# Patient Record
Sex: FEMALE | Race: BLACK OR AFRICAN AMERICAN | NOT HISPANIC OR LATINO
[De-identification: names, ages, dates, MRNs, and addresses within clinical notes are randomized per-mention and may not be internally consistent; named-entity substitution may affect disease eponyms.]

---

## 2018-03-16 ENCOUNTER — APPOINTMENT (OUTPATIENT)
Dept: INTERNAL MEDICINE | Facility: CLINIC | Age: 48
End: 2018-03-16
Payer: COMMERCIAL

## 2018-03-16 VITALS — SYSTOLIC BLOOD PRESSURE: 136 MMHG | DIASTOLIC BLOOD PRESSURE: 90 MMHG

## 2018-03-16 VITALS
BODY MASS INDEX: 45.15 KG/M2 | HEIGHT: 65 IN | WEIGHT: 271 LBS | SYSTOLIC BLOOD PRESSURE: 142 MMHG | DIASTOLIC BLOOD PRESSURE: 90 MMHG

## 2018-03-16 VITALS — DIASTOLIC BLOOD PRESSURE: 90 MMHG | SYSTOLIC BLOOD PRESSURE: 140 MMHG

## 2018-03-16 DIAGNOSIS — Z82.3 FAMILY HISTORY OF STROKE: ICD-10-CM

## 2018-03-16 DIAGNOSIS — Z78.9 OTHER SPECIFIED HEALTH STATUS: ICD-10-CM

## 2018-03-16 DIAGNOSIS — L65.9 NONSCARRING HAIR LOSS, UNSPECIFIED: ICD-10-CM

## 2018-03-16 DIAGNOSIS — Z83.3 FAMILY HISTORY OF DIABETES MELLITUS: ICD-10-CM

## 2018-03-16 PROCEDURE — 90715 TDAP VACCINE 7 YRS/> IM: CPT

## 2018-03-16 PROCEDURE — 90471 IMMUNIZATION ADMIN: CPT

## 2018-03-16 PROCEDURE — 99386 PREV VISIT NEW AGE 40-64: CPT | Mod: 25

## 2018-04-05 LAB
25(OH)D3 SERPL-MCNC: 20 NG/ML
ALBUMIN SERPL ELPH-MCNC: 4 G/DL
ALP BLD-CCNC: 69 U/L
ALT SERPL-CCNC: 15 U/L
ANION GAP SERPL CALC-SCNC: 12 MMOL/L
AST SERPL-CCNC: 16 U/L
BASOPHILS # BLD AUTO: 0.02 K/UL
BASOPHILS NFR BLD AUTO: 0.3 %
BILIRUB SERPL-MCNC: 0.3 MG/DL
BUN SERPL-MCNC: 18 MG/DL
CALCIUM SERPL-MCNC: 9.3 MG/DL
CHLORIDE SERPL-SCNC: 106 MMOL/L
CHOLEST SERPL-MCNC: 170 MG/DL
CHOLEST/HDLC SERPL: 3.3 RATIO
CO2 SERPL-SCNC: 25 MMOL/L
CREAT SERPL-MCNC: 0.86 MG/DL
EOSINOPHIL # BLD AUTO: 0.05 K/UL
EOSINOPHIL NFR BLD AUTO: 0.7 %
GLUCOSE SERPL-MCNC: 97 MG/DL
HBA1C MFR BLD HPLC: 5.8 %
HCT VFR BLD CALC: 36.5 %
HCV AB SER QL: NONREACTIVE
HCV S/CO RATIO: 0.12 S/CO
HDLC SERPL-MCNC: 51 MG/DL
HGB BLD-MCNC: 11.7 G/DL
HIV1+2 AB SPEC QL IA.RAPID: NONREACTIVE
IMM GRANULOCYTES NFR BLD AUTO: 0.1 %
LDLC SERPL CALC-MCNC: 106 MG/DL
LYMPHOCYTES # BLD AUTO: 2.57 K/UL
LYMPHOCYTES NFR BLD AUTO: 35.3 %
MAN DIFF?: NORMAL
MCHC RBC-ENTMCNC: 28.5 PG
MCHC RBC-ENTMCNC: 32.1 GM/DL
MCV RBC AUTO: 89 FL
MONOCYTES # BLD AUTO: 0.65 K/UL
MONOCYTES NFR BLD AUTO: 8.9 %
NEUTROPHILS # BLD AUTO: 3.99 K/UL
NEUTROPHILS NFR BLD AUTO: 54.7 %
PLATELET # BLD AUTO: 289 K/UL
POTASSIUM SERPL-SCNC: 4.5 MMOL/L
PROT SERPL-MCNC: 7.8 G/DL
RBC # BLD: 4.1 M/UL
RBC # FLD: 15.4 %
SODIUM SERPL-SCNC: 143 MMOL/L
TRIGL SERPL-MCNC: 67 MG/DL
TSH SERPL-ACNC: 0.91 UIU/ML
WBC # FLD AUTO: 7.29 K/UL

## 2018-05-29 ENCOUNTER — APPOINTMENT (OUTPATIENT)
Dept: OBGYN | Facility: CLINIC | Age: 48
End: 2018-05-29

## 2018-06-05 ENCOUNTER — APPOINTMENT (OUTPATIENT)
Dept: INTERNAL MEDICINE | Facility: CLINIC | Age: 48
End: 2018-06-05

## 2018-07-03 ENCOUNTER — APPOINTMENT (OUTPATIENT)
Dept: INTERNAL MEDICINE | Facility: CLINIC | Age: 48
End: 2018-07-03
Payer: COMMERCIAL

## 2018-07-03 VITALS
OXYGEN SATURATION: 98 % | WEIGHT: 266 LBS | BODY MASS INDEX: 44.27 KG/M2 | HEART RATE: 78 BPM | SYSTOLIC BLOOD PRESSURE: 120 MMHG | DIASTOLIC BLOOD PRESSURE: 82 MMHG

## 2018-07-03 VITALS — SYSTOLIC BLOOD PRESSURE: 120 MMHG | DIASTOLIC BLOOD PRESSURE: 78 MMHG

## 2018-07-03 PROCEDURE — 99213 OFFICE O/P EST LOW 20 MIN: CPT

## 2018-07-05 ENCOUNTER — MESSAGE (OUTPATIENT)
Age: 48
End: 2018-07-05

## 2018-07-12 ENCOUNTER — RX RENEWAL (OUTPATIENT)
Age: 48
End: 2018-07-12

## 2018-07-23 PROBLEM — Z78.9 ALCOHOL USE: Status: ACTIVE | Noted: 2018-03-16

## 2018-07-25 RX ORDER — DULAGLUTIDE 0.75 MG/.5ML
0.75 INJECTION, SOLUTION SUBCUTANEOUS
Qty: 1 | Refills: 2 | Status: DISCONTINUED | COMMUNITY
Start: 2018-07-05 | End: 2018-07-25

## 2018-07-25 NOTE — ADDENDUM
[FreeTextEntry1] : 7/25/18:  Tried trulicity for weight loss, pt took 3 injections (one per week), started having diarrhea soon after first dose, but for past several days having vomiting as well, tolerating minimal po (trying to drink coconut water, fluids), no abdominal pain, no fevers/chills.  Advised to stop trulicity, will eventually look into other options but not now, doesn't feel dizzy/lightheaded, advised small sips of fluids, bland diet with small meals/crackers (to advance as tolerated), if no improvement by next week or worsening symptoms to call back.

## 2018-07-25 NOTE — ASSESSMENT
[FreeTextEntry1] : 46 yo female with h/o as above including HTN and obesity here for f/up.  BP at goal, c/w amlodipine.  Will start walking up and down stairs for 15-20 minutes for exercise, advised cutting down on carbs at night, will also reach out to pharmacy to find out what meds for weight loss are covered under her plan.  RTO 4-5 months f/up visit, bp check and weight check.

## 2018-07-25 NOTE — HISTORY OF PRESENT ILLNESS
[FreeTextEntry1] : bp check [de-identified] : 46 yo female with h/o as below including HTN here for f/up and bp check.\par Eating less, eating less fatty foods, doesn't like sugar, doesn't eat pastry, doesn't drink milk.  Doesn't eat bread, does eat rice but brown rice.  Will eat 6-8 water crackers in the morning, will have 2 eggs in the morning.  Eats a lot of vegetables.  Doesn't snack in the middle of the day.  Doesn't eat lunch.  Dinner is candied yam, vegetable, chicken, turkey or fish.  Sometimes has white potatoes.  Sometimes eat shortbread at night.  Does drink a lot of ginger ale and water, doesn't drink juice, sometimes flavored water.  Hasn't drank alcohol in a long time but on occasion will have vodka.  Doesn't have much time for exercise, works 5 days/week with one job and another job on the weekends, and commute can be 2-4 hours each way due to not driving.  Has stairs at home.\par No other active issues, feeling well, tolerating amlodipine without difficulty.\par Would like medication to help with weight loss, only lost few lbs since last visit despite dietary changes.

## 2018-08-23 ENCOUNTER — TRANSCRIPTION ENCOUNTER (OUTPATIENT)
Age: 48
End: 2018-08-23

## 2018-11-02 ENCOUNTER — APPOINTMENT (OUTPATIENT)
Dept: INTERNAL MEDICINE | Facility: CLINIC | Age: 48
End: 2018-11-02
Payer: COMMERCIAL

## 2018-11-02 VITALS — HEART RATE: 88 BPM

## 2018-11-02 VITALS
BODY MASS INDEX: 43.65 KG/M2 | WEIGHT: 262 LBS | HEIGHT: 65 IN | SYSTOLIC BLOOD PRESSURE: 130 MMHG | DIASTOLIC BLOOD PRESSURE: 70 MMHG

## 2018-11-02 LAB
BILIRUB UR QL STRIP: NORMAL
CLARITY UR: CLEAR
COLLECTION METHOD: NORMAL
GLUCOSE UR-MCNC: NORMAL
HBA1C MFR BLD HPLC: 5.7
HCG UR QL: 0.2 EU/DL
HCG UR QL: NEGATIVE
HGB UR QL STRIP.AUTO: NORMAL
KETONES UR-MCNC: NORMAL
LEUKOCYTE ESTERASE UR QL STRIP: NORMAL
NITRITE UR QL STRIP: NORMAL
PH UR STRIP: 6.5
PROT UR STRIP-MCNC: NORMAL
QUALITY CONTROL: YES
SP GR UR STRIP: 1.02

## 2018-11-02 PROCEDURE — 90686 IIV4 VACC NO PRSV 0.5 ML IM: CPT

## 2018-11-02 PROCEDURE — G0008: CPT

## 2018-11-02 PROCEDURE — 99214 OFFICE O/P EST MOD 30 MIN: CPT | Mod: 25

## 2018-11-02 NOTE — ASSESSMENT
[FreeTextEntry1] : 47 yo female with h/o as above including HTN and morbid obesity here for f/up visit and to address several issues.\par 1.  CV - bp at goal\par 2.  GI - new GERD, will try Prilosec x 2-4 week trial, if persists or no improvement may need GI eval, could consider H pylori testing at some point if doesn't fully respond (was in [place] not too long ago); suspect rectal symptoms internal hemorrhoid that prolapses, offered CRS eval but declining currently, will monitor\par 3.   - frequent urination, UA negative, upreg negative, hgbA1c 5.7 (all noncontributory)\par 4.  Endo - did not tolerate trulicity, will try saxenda for weight loss instead, start 0.6 mg/day and then increase after one week to 1.2 mg/day (will start in a few weeks after GERD under control) and then to f/up in 1 month to adjust further, counseled on risks of pancreatitis, GI symptoms, and thyroid cancer, advised can't get pregnant on medication (still wants to try medication)\par 5.  HCM - flu shot today\par 6.  RTO 1 month f/up visit

## 2018-11-02 NOTE — HISTORY OF PRESENT ILLNESS
[FreeTextEntry1] : f/up visit [de-identified] : 49 yo female with h/o as below including HTN and obesity here for f/up visit.\par Has been having a lot of heartburn for past 1-2 weeks, burning from throat to stomach.  Burning anytime has something to eat or drink.  No chest pain, feels more like burning.  Takes Tums with slight relief.  Feeling full and bloated, new for her.  No dysphagia, no odynophagia, no blood in stool, no melena.  No diarrhea, but going to bathroom frequently for bowel movements as well as for urination.  Sometimes feeling more thirsty than usual.  Urinating more at night when she is awake, sleeps during the day.  Drinking gingerbear (soda).  No dysuria, no other urinary symptoms, just frequency.\par When having bowel movement, feels piece of "flesh" coming out, then eventually will slide back in but feeling very uncomfortable.  Not bleeding unless wipes hard.\par No other active issues.  Slight HA, sometimes very sharp pain in head (like lightening shock), sometimes sharp pain in vaginal area as well, both resolve very quickly.  No vision changes, no dizziness or fainting.\par No other active issues.  Would like flu shot today.  Last menses 10/7/18.  Interested in starting another medication for weight loss.

## 2018-11-02 NOTE — PHYSICAL EXAM
[No Acute Distress] : no acute distress [Well Nourished] : well nourished [Well Developed] : well developed [Well-Appearing] : well-appearing [Supple] : supple [No Respiratory Distress] : no respiratory distress  [Clear to Auscultation] : lungs were clear to auscultation bilaterally [No Accessory Muscle Use] : no accessory muscle use [Normal Rate] : normal rate  [Regular Rhythm] : with a regular rhythm [Normal S1, S2] : normal S1 and S2 [No Murmur] : no murmur heard [No Edema] : there was no peripheral edema [Soft] : abdomen soft [Non Tender] : non-tender [Non-distended] : non-distended [No Masses] : no abdominal mass palpated [No HSM] : no HSM [Normal Bowel Sounds] : normal bowel sounds [FreeTextEntry1] : small external hemorrhoids (nontender), no masses palpated on rectal exam internally, possible internal hemorrhoids

## 2018-12-07 ENCOUNTER — APPOINTMENT (OUTPATIENT)
Dept: INTERNAL MEDICINE | Facility: CLINIC | Age: 48
End: 2018-12-07
Payer: COMMERCIAL

## 2018-12-07 VITALS
WEIGHT: 260 LBS | HEIGHT: 65 IN | OXYGEN SATURATION: 98 % | SYSTOLIC BLOOD PRESSURE: 128 MMHG | BODY MASS INDEX: 43.32 KG/M2 | HEART RATE: 81 BPM | DIASTOLIC BLOOD PRESSURE: 78 MMHG

## 2018-12-07 PROCEDURE — 99213 OFFICE O/P EST LOW 20 MIN: CPT

## 2018-12-07 RX ORDER — LIRAGLUTIDE 6 MG/ML
18 INJECTION, SOLUTION SUBCUTANEOUS
Qty: 1 | Refills: 0 | Status: DISCONTINUED | COMMUNITY
Start: 2018-11-02 | End: 2018-12-07

## 2018-12-09 NOTE — ASSESSMENT
[FreeTextEntry1] : 47 yo female with h/o as above including HTN and morbid obesity here for f/up. Will start belviq for weight loss, counseled on side effects, will check CBC next month.  C/w low carb diet.  BP at goal.  RTO 1-2 months f/up visit.

## 2018-12-09 NOTE — HISTORY OF PRESENT ILLNESS
[FreeTextEntry1] : weight check [de-identified] : 47 yo female with h/o as below including HTN and morbid obesity here for f/up visit and weight check.  Saxenda wasn't approved as was instructed needed to try other medications first.  Willing to try another medication instead.  Has been working on diet, has used low carbohydrate diet and lost few lbs.  Difficult to exercise regularly due to work schedule.  Unable to come in to weight management clinic due to work schedule, hard to get into office.  Not looking to get pregnant at this time.  No other active issues.

## 2018-12-18 RX ORDER — LORCASERIN HYDROCHLORIDE HEMIHYDRATE 10 MG/1
10 TABLET ORAL TWICE DAILY
Qty: 60 | Refills: 2 | Status: DISCONTINUED | COMMUNITY
Start: 2018-12-07 | End: 2018-12-18

## 2018-12-19 RX ORDER — PEN NEEDLE, DIABETIC 29 G X1/2"
32G X 4 MM NEEDLE, DISPOSABLE MISCELLANEOUS
Qty: 1 | Refills: 3 | Status: ACTIVE | COMMUNITY
Start: 2018-12-19 | End: 1900-01-01

## 2019-03-08 ENCOUNTER — APPOINTMENT (OUTPATIENT)
Dept: INTERNAL MEDICINE | Facility: CLINIC | Age: 49
End: 2019-03-08

## 2019-03-11 ENCOUNTER — APPOINTMENT (OUTPATIENT)
Dept: INTERNAL MEDICINE | Facility: CLINIC | Age: 49
End: 2019-03-11
Payer: COMMERCIAL

## 2019-03-11 ENCOUNTER — NON-APPOINTMENT (OUTPATIENT)
Age: 49
End: 2019-03-11

## 2019-03-11 VITALS
SYSTOLIC BLOOD PRESSURE: 120 MMHG | WEIGHT: 251 LBS | HEART RATE: 90 BPM | DIASTOLIC BLOOD PRESSURE: 82 MMHG | BODY MASS INDEX: 41.82 KG/M2 | OXYGEN SATURATION: 96 % | HEIGHT: 65 IN

## 2019-03-11 PROCEDURE — 99214 OFFICE O/P EST MOD 30 MIN: CPT | Mod: 25

## 2019-03-11 PROCEDURE — 93000 ELECTROCARDIOGRAM COMPLETE: CPT

## 2019-03-22 LAB
25(OH)D3 SERPL-MCNC: 23.5 NG/ML
ALBUMIN SERPL ELPH-MCNC: 4 G/DL
ALP BLD-CCNC: 83 U/L
ALT SERPL-CCNC: 14 U/L
ANION GAP SERPL CALC-SCNC: 13 MMOL/L
AST SERPL-CCNC: 14 U/L
BASOPHILS # BLD AUTO: 0.03 K/UL
BASOPHILS NFR BLD AUTO: 0.3 %
BILIRUB SERPL-MCNC: 0.4 MG/DL
BUN SERPL-MCNC: 11 MG/DL
CALCIUM SERPL-MCNC: 9 MG/DL
CHLORIDE SERPL-SCNC: 104 MMOL/L
CHOLEST SERPL-MCNC: 158 MG/DL
CHOLEST/HDLC SERPL: 2.6 RATIO
CO2 SERPL-SCNC: 24 MMOL/L
CREAT SERPL-MCNC: 0.75 MG/DL
EOSINOPHIL # BLD AUTO: 0.07 K/UL
EOSINOPHIL NFR BLD AUTO: 0.8 %
GLUCOSE SERPL-MCNC: 76 MG/DL
HBA1C MFR BLD HPLC: 5.5 %
HCG SERPL QL: NEGATIVE
HCT VFR BLD CALC: 39 %
HDLC SERPL-MCNC: 60 MG/DL
HGB BLD-MCNC: 11.4 G/DL
IMM GRANULOCYTES NFR BLD AUTO: 0.2 %
LDLC SERPL CALC-MCNC: 89 MG/DL
LYMPHOCYTES # BLD AUTO: 2.95 K/UL
LYMPHOCYTES NFR BLD AUTO: 32.2 %
MAN DIFF?: NORMAL
MCHC RBC-ENTMCNC: 26.5 PG
MCHC RBC-ENTMCNC: 29.2 GM/DL
MCV RBC AUTO: 90.5 FL
MONOCYTES # BLD AUTO: 0.67 K/UL
MONOCYTES NFR BLD AUTO: 7.3 %
NEUTROPHILS # BLD AUTO: 5.42 K/UL
NEUTROPHILS NFR BLD AUTO: 59.2 %
PAPP-A SERPL-ACNC: <1 MIU/ML
PLATELET # BLD AUTO: 308 K/UL
POTASSIUM SERPL-SCNC: 3.9 MMOL/L
PROT SERPL-MCNC: 7.3 G/DL
RBC # BLD: 4.31 M/UL
RBC # FLD: 15.9 %
SODIUM SERPL-SCNC: 141 MMOL/L
TRIGL SERPL-MCNC: 44 MG/DL
TSH SERPL-ACNC: 1.76 UIU/ML
WBC # FLD AUTO: 9.16 K/UL

## 2019-03-22 NOTE — PHYSICAL EXAM
[No Acute Distress] : no acute distress [Well Nourished] : well nourished [Well Developed] : well developed [Well-Appearing] : well-appearing [Supple] : supple [No Respiratory Distress] : no respiratory distress  [Clear to Auscultation] : lungs were clear to auscultation bilaterally [No Accessory Muscle Use] : no accessory muscle use [Normal Rate] : normal rate  [Regular Rhythm] : with a regular rhythm [Normal S1, S2] : normal S1 and S2 [No Murmur] : no murmur heard [de-identified] : trace-1+pitting edema LE b/l

## 2019-03-22 NOTE — REVIEW OF SYSTEMS
[Negative] : Heme/Lymph [FreeTextEntry5] : see hpi [FreeTextEntry7] : see hpi [de-identified] : see hpi

## 2019-03-22 NOTE — ADDENDUM
[FreeTextEntry1] : 3/22/19:  Discussed labs with pt, nl except Hgb 11.4 c/w slight anemia (suspect from menstruation), vitamin D 23 c/w insufficiency, to take vitamin D 2000 Iu/day, other labs nl.  Notes has had vomiting with victoza, did increase dose to 1.8 mg last 2 times, this time had vomiting significantly today, unable to tolerate solid po, some abdominal pain - advised to stop victoza for now, if continued abdominal pain or inability to take po, to go to ER to r/o pancreatitis, sips of fluids for now.

## 2019-03-22 NOTE — HISTORY OF PRESENT ILLNESS
[FreeTextEntry1] : f/up, weight check [de-identified] : 47 yo female with h/o as below including obesity and HTN here for f/up visit and weight check.  \josé miguel Has been using victoza, but having upset stomach and burping with it, difficulty eating.  If skips every other day and not eating after it, will feel better.  Lost 9 lbs since last visit.  Has been on victoza 1.2 mg every other day.  Would like to know if medication could be changed, if can get quicker weight loss.  Going up and down stairs at home and at work.  Eating a lot less than before.  \josé miguel Had menses last month on 2/12, then had spotting on 3/6/19, not true menses.  Would like to do pregnancy test today.  \josé miguel Was shopping in a store recently, suddenly had some palpitations and then things went dark, didn't pass out but had to hang head over shopping cart, then resolved.  Happened once years before and had evaluation done then and was told was fine.\par No other active issues.

## 2019-03-22 NOTE — ASSESSMENT
[FreeTextEntry1] : 47 yo female with h/o as above including HTN, preDM, morbid obesity, here for f/up visit and weight check.\par 1.  Endo - weight improved on victoza 1.2 mg (can only tolerate every other day), will increase to 1.8 mg, c/w diet/exercise; check TSH and hgBA1c\par 2.  CV - bp at goal on amlodipine; ECG done today for episode of palpitations/presyncope NSR with no gross abnl, ?vasovagal episode (with palpitations compensatory tachycardia prior to presyncope) vs. arrhythmia, if recurs will eval further; check lipids\par 3. Gyn - check pregnancy test for irregular menses this month\par 4.  RTO for CPE in few months as due and another weight check, check routine labs today as due\par

## 2019-04-12 LAB
DRUG ABUSE PANEL-9, SERUM: NORMAL
M TB IFN-G BLD-IMP: NEGATIVE
MEV IGG FLD QL IA: >300 AU/ML
MEV IGG+IGM SER-IMP: POSITIVE
QUANTIFERON TB PLUS MITOGEN MINUS NIL: >10 IU/ML
QUANTIFERON TB PLUS NIL: 0.02 IU/ML
QUANTIFERON TB PLUS TB1 MINUS NIL: 0 IU/ML
QUANTIFERON TB PLUS TB2 MINUS NIL: 0 IU/ML
RUBV IGG FLD-ACNC: 7.3 INDEX
RUBV IGG SER-IMP: POSITIVE

## 2019-04-15 ENCOUNTER — RX RENEWAL (OUTPATIENT)
Age: 49
End: 2019-04-15

## 2019-06-11 ENCOUNTER — APPOINTMENT (OUTPATIENT)
Dept: INTERNAL MEDICINE | Facility: CLINIC | Age: 49
End: 2019-06-11
Payer: COMMERCIAL

## 2019-06-11 VITALS
HEART RATE: 85 BPM | DIASTOLIC BLOOD PRESSURE: 72 MMHG | WEIGHT: 256 LBS | SYSTOLIC BLOOD PRESSURE: 110 MMHG | OXYGEN SATURATION: 98 % | HEIGHT: 65 IN | BODY MASS INDEX: 42.65 KG/M2

## 2019-06-11 DIAGNOSIS — Z87.19 PERSONAL HISTORY OF OTHER DISEASES OF THE DIGESTIVE SYSTEM: ICD-10-CM

## 2019-06-11 DIAGNOSIS — E01.0 IODINE-DEFICIENCY RELATED DIFFUSE (ENDEMIC) GOITER: ICD-10-CM

## 2019-06-11 DIAGNOSIS — Z87.898 PERSONAL HISTORY OF OTHER SPECIFIED CONDITIONS: ICD-10-CM

## 2019-06-11 PROCEDURE — 99396 PREV VISIT EST AGE 40-64: CPT

## 2019-06-11 RX ORDER — LIRAGLUTIDE 6 MG/ML
18 INJECTION SUBCUTANEOUS
Qty: 3 | Refills: 3 | Status: DISCONTINUED | COMMUNITY
Start: 2018-12-18 | End: 2019-06-11

## 2019-06-11 NOTE — HEALTH RISK ASSESSMENT
[0] : 2) Feeling down, depressed, or hopeless: Not at all (0) [MammogramComments] : ?2 years ago [PapSmearComments] : years ago [ColonoscopyComments] : never had

## 2019-06-11 NOTE — PHYSICAL EXAM
[No Acute Distress] : no acute distress [Well Nourished] : well nourished [Well Developed] : well developed [Well-Appearing] : well-appearing [Normal Oropharynx] : the oropharynx was normal [PERRL] : pupils equal round and reactive to light [Normal TMs] : both tympanic membranes were normal [Normal Nasal Mucosa] : the nasal mucosa was normal [Supple] : supple [No Lymphadenopathy] : no lymphadenopathy [No Respiratory Distress] : no respiratory distress  [Clear to Auscultation] : lungs were clear to auscultation bilaterally [No Accessory Muscle Use] : no accessory muscle use [Normal Rate] : normal rate  [Regular Rhythm] : with a regular rhythm [Normal S1, S2] : normal S1 and S2 [No Murmur] : no murmur heard [No Carotid Bruits] : no carotid bruits [Pedal Pulses Present] : the pedal pulses are present [Normal Appearance] : normal in appearance [No Nipple Discharge] : no nipple discharge [Soft] : abdomen soft [No Axillary Lymphadenopathy] : no axillary lymphadenopathy [Non Tender] : non-tender [Non-distended] : non-distended [No Masses] : no abdominal mass palpated [No HSM] : no HSM [Normal Bowel Sounds] : normal bowel sounds [Normal Supraclavicular Nodes] : no supraclavicular lymphadenopathy [Normal Axillary Nodes] : no axillary lymphadenopathy [Normal Posterior Cervical Nodes] : no posterior cervical lymphadenopathy [Normal Anterior Cervical Nodes] : no anterior cervical lymphadenopathy [Normal Inguinal Nodes] : no inguinal lymphadenopathy [No Joint Swelling] : no joint swelling [Normal Gait] : normal gait [Normal Affect] : the affect was normal [de-identified] : prominent thyroid without discrete nodules [de-identified] : trace pitting edema LE b/l [de-identified] : left nipple inversion (chronic) [de-identified] : right lateral hip tenderness to palpation, FROM right hip without difficulty or pain [de-identified] : 3-4 cm long linear burn right lateral hip/lateral upper thigh with overlying scab/crusting over much of burn, minimal exposed granulation tissue, no surrounding erythema/edema, no drainage from area; tattoos; scars left ankle from childhood

## 2019-06-11 NOTE — ASSESSMENT
[FreeTextEntry1] : 47 yo female with h/o as above including HTN, preDM, morbid obesity, here for CPE.\par 1.  CV - bp at goal, renewed amlodipine, lipids at goal recently\par 2.  Endo - preDM previously, didn't tolerate victoza for obesity, will try metformin for both, weight check in 3 months; thyromegaly with nl TSH recently and fam hx thyroid disease, check sono; advised vitamin D supplements for low vitamin D previously\par 3.  MSK - suspect bursitis right hip, referred to ortho for steroid injection\par 4.  Derm- resolving burn right hip, no evidence of infection, in healing stage, suggested silvadene cream if needed\par 5.  GI - offered colon cancer screening but declining, prefers to wait until age 51\par 6.  Gyn - referred again to gyn Dr. Adler for eval, pap, and discussion of pregnancy options given had tubal ligation; declines mammo and prefers to wait until age 51\par 7.  HCM - had nl labs otherwise few months ago so hold on repeating, vaccines utd\par 8.  RTO 3 months weight check, 6 months bp check

## 2019-06-11 NOTE — REVIEW OF SYSTEMS
[Negative] : Eyes [Fever] : no fever [Chills] : no chills [Fatigue] : no fatigue [Recent Change In Weight] : ~T no recent weight change [Chest Pain] : no chest pain [Palpitations] : no palpitations [Cough] : no cough [Shortness Of Breath] : no shortness of breath [Abdominal Pain] : no abdominal pain [Constipation] : no constipation [Nausea] : no nausea [Diarrhea] : diarrhea [Heartburn] : no heartburn [Vomiting] : no vomiting [Melena] : no melena [Dysuria] : no dysuria [Vaginal Discharge] : no vaginal discharge [Skin Rash] : no skin rash [Dizziness] : no dizziness [Fainting] : no fainting [Anxiety] : no anxiety [Depression] : no depression [Easy Bleeding] : no easy bleeding [Swollen Glands] : no swollen glands [Easy Bruising] : no easy bruising [FreeTextEntry3] : sees optho, wears glasses [FreeTextEntry9] : see hpi [de-identified] : see hpi [FreeTextEntry6] : SOB only if drinking too much coffee or angry

## 2019-06-11 NOTE — HISTORY OF PRESENT ILLNESS
[FreeTextEntry1] : physical [de-identified] : 49 yo female with h/o as below here for CPE.\par Feeling well overall.  Since last visit, had vomiting with victoza significantly, so stopped victoza and feeling better.  Trying to exercise, walking a lot, believes diet is good, lost a little more weight.  Would be interested in another weight loss drug if covered by insurance.  Had used metformin previously (tried mother's metformin) and worked well for her.\par Has been having right hip pain, lateral hip pain when sitting and something resting against it, or if sleeping on that hip at night.\par Also sat on hot curling iron right lateral hip recently, got terrible burn, now healing.\par No other active issues.

## 2019-06-11 NOTE — PAST MEDICAL HISTORY
[Menstruating] : menstruating [Excessive Bleeding] : there was excessive bleeding [Regular Cycle Intervals] : have been regular [Total Preg ___] : G[unfilled] [AB Spont ___] : miscarriages: [unfilled]  [Full Term ___] : Full Term: [unfilled]

## 2019-10-17 ENCOUNTER — APPOINTMENT (OUTPATIENT)
Dept: NEUROLOGY | Facility: CLINIC | Age: 49
End: 2019-10-17

## 2019-11-10 ENCOUNTER — EMERGENCY (EMERGENCY)
Facility: HOSPITAL | Age: 49
LOS: 1 days | Discharge: HOME | End: 2019-11-10
Attending: EMERGENCY MEDICINE | Admitting: EMERGENCY MEDICINE
Payer: COMMERCIAL

## 2019-11-10 VITALS
DIASTOLIC BLOOD PRESSURE: 93 MMHG | OXYGEN SATURATION: 99 % | WEIGHT: 257.06 LBS | SYSTOLIC BLOOD PRESSURE: 153 MMHG | HEART RATE: 83 BPM | RESPIRATION RATE: 18 BRPM | HEIGHT: 66 IN | TEMPERATURE: 98 F

## 2019-11-10 VITALS
RESPIRATION RATE: 18 BRPM | HEART RATE: 66 BPM | SYSTOLIC BLOOD PRESSURE: 108 MMHG | TEMPERATURE: 98 F | OXYGEN SATURATION: 98 % | DIASTOLIC BLOOD PRESSURE: 68 MMHG

## 2019-11-10 PROCEDURE — 73130 X-RAY EXAM OF HAND: CPT | Mod: 26,LT

## 2019-11-10 PROCEDURE — 99053 MED SERV 10PM-8AM 24 HR FAC: CPT

## 2019-11-10 PROCEDURE — 73130 X-RAY EXAM OF HAND: CPT

## 2019-11-10 PROCEDURE — 73110 X-RAY EXAM OF WRIST: CPT | Mod: 26,LT

## 2019-11-10 PROCEDURE — 99284 EMERGENCY DEPT VISIT MOD MDM: CPT

## 2019-11-10 PROCEDURE — 73110 X-RAY EXAM OF WRIST: CPT

## 2019-11-10 PROCEDURE — 99283 EMERGENCY DEPT VISIT LOW MDM: CPT

## 2019-11-10 RX ORDER — OXYCODONE HYDROCHLORIDE 5 MG/1
1 TABLET ORAL
Qty: 8 | Refills: 0
Start: 2019-11-10 | End: 2019-11-11

## 2019-11-10 RX ORDER — LIDOCAINE 4 G/100G
1 CREAM TOPICAL ONCE
Refills: 0 | Status: COMPLETED | OUTPATIENT
Start: 2019-11-10 | End: 2019-11-10

## 2019-11-10 RX ORDER — OXYCODONE HYDROCHLORIDE 5 MG/1
10 TABLET ORAL ONCE
Refills: 0 | Status: DISCONTINUED | OUTPATIENT
Start: 2019-11-10 | End: 2019-11-10

## 2019-11-10 RX ORDER — ACETAMINOPHEN 500 MG
975 TABLET ORAL ONCE
Refills: 0 | Status: COMPLETED | OUTPATIENT
Start: 2019-11-10 | End: 2019-11-10

## 2019-11-10 RX ORDER — DIAZEPAM 5 MG
2 TABLET ORAL ONCE
Refills: 0 | Status: DISCONTINUED | OUTPATIENT
Start: 2019-11-10 | End: 2019-11-10

## 2019-11-10 RX ORDER — IBUPROFEN 200 MG
600 TABLET ORAL ONCE
Refills: 0 | Status: COMPLETED | OUTPATIENT
Start: 2019-11-10 | End: 2019-11-10

## 2019-11-10 RX ADMIN — LIDOCAINE 1 PATCH: 4 CREAM TOPICAL at 07:04

## 2019-11-10 RX ADMIN — Medication 975 MILLIGRAM(S): at 05:05

## 2019-11-10 RX ADMIN — Medication 600 MILLIGRAM(S): at 05:05

## 2019-11-10 RX ADMIN — OXYCODONE HYDROCHLORIDE 10 MILLIGRAM(S): 5 TABLET ORAL at 07:04

## 2019-11-10 NOTE — ED ADULT NURSE NOTE - OBJECTIVE STATEMENT
Patient is a 49y female presenting to the ED ambulatory from home s/p fall. Patient A&Ox.4. Patient reports slipping and falling on nice at around 8:30-9 pm Reports falling backwards and used her hands to break her fall. Denies head trauma. Denies LOC. Reports 9/10 lower back pain with movement. Reports being able to ambulate after the fall but reports worsening pain with ambulation. Also reports 4/10 left shoulder pain after the fall. PMH HTN, DM on metformin. Patient is a 49y female presenting to the ED ambulatory from home s/p fall. Patient A&Ox.4. Patient reports slipping and falling on nice at around 8:30-9 pm Reports falling backwards and used her hands to break her fall. Denies head trauma. Denies LOC. Reports 9/10 lower back pain with movement. Reports being able to ambulate after the fall but reports worsening pain with ambulation. Also reports 4/10 left shoulder pain after the fall. Heart sounds heard upon auscultation. Lung sounds clear bilaterally. Abdomen soft, non-distended, and non-tender upon palpation. Denies chest pain, SOB, headache, N/V/D, abdominal pain, fever/chills, numbness/tingling, burning upon urination or difficulty urinating, hematuria. PMH HTN, DM on metformin.

## 2019-11-10 NOTE — ED PROVIDER NOTE - PHYSICAL EXAMINATION
Gen: AAOx3, non-toxic  Head: NCAT  HEENT: EOMI, oral mucosa moist, normal conjunctiva  Lung: CTAB, no respiratory distress, no wheezes/rhonchi/rales B/L, speaking in full sentences  CV: RRR, no murmurs, rubs or gallops  Abd: soft, NTND, no guarding, no CVA tenderness  MSK: no cervical spine TTP. Lumbar back pain.   Neuro: No focal sensory or motor deficits, normal CN exam   Skin: Warm, well perfused, no rash  Psych: normal affect.

## 2019-11-10 NOTE — ED ADULT NURSE NOTE - NSIMPLEMENTINTERV_GEN_ALL_ED
Implemented All Fall Risk Interventions:  [place] to call system. Call bell, personal items and telephone within reach. Instruct patient to call for assistance. Room bathroom lighting operational. Non-slip footwear when patient is off stretcher. Physically safe environment: no spills, clutter or unnecessary equipment. Stretcher in lowest position, wheels locked, appropriate side rails in place. Provide visual cue, wrist band, yellow gown, etc. Monitor gait and stability. Monitor for mental status changes and reorient to person, place, and time. Review medications for side effects contributing to fall risk. Reinforce activity limits and safety measures with patient and family.

## 2019-11-10 NOTE — ED PROVIDER NOTE - NSFOLLOWUPINSTRUCTIONS_ED_ALL_ED_FT
You were seen in the ED for a fall resulting in back pain  The following labs/imaging were obtained: see attached  Take Acetaminophen (Tylenol) (no more than 4,000mg in 24 hrs) AND /OR Ibuprofen (Motrin) with meals (no more than 1,200mg in 24 hrs) for pain.  Return to the ED if you develop fever, worsening pain, or new symptoms.   Follow up with your primary care in 2-3 days.  Discussed with pt results of work up, strict return precautions, and need for follow up.  Pt expressed understanding and agrees with plan.

## 2019-11-10 NOTE — ED ADULT NURSE REASSESSMENT NOTE - NS ED NURSE REASSESS COMMENT FT1
report received from Rodrigo EMANUEL. patient is resting in bed. family at bedside. patient complains of pain at an 8/10 in the lower back. patient is aaox3, lung sounds CTA bilaterally, radial pulses +2 bilaterally, abdomen soft, nondistended, nontender. patient denies SOB, chest pain, n/v/d, cough, HA, dizziness, abd pain, weakness, numbness or tingling. VSS. will continue to monitor. patient safety and comfort maintained.

## 2019-11-10 NOTE — ED PROVIDER NOTE - NS ED ROS FT
GENERAL: No fever or chills  EYES: no change in vision  HEENT: no trouble swallowing or speaking  CARDIAC: no chest pain or palpitations   PULMONARY: no cough or SOB  GI: no abdominal pain, nausea, vomiting, diarrhea, or constipation   : No changes in urination  SKIN: no rashes  NEURO: no headache, numbness, or weakness.  MSK: see hpi

## 2019-11-10 NOTE — ED ADULT NURSE REASSESSMENT NOTE - NS ED NURSE REASSESS COMMENT FT1
Patient reports 9/10 lower back pain. MD Gallardo made aware, patient to receive pain medication as per EMAR. Plan of care discussed. Safety and comfort measures maintained. Will continue to monitor.

## 2019-11-10 NOTE — ED PROVIDER NOTE - CARE PLAN
Principal Discharge DX:	Fall, initial encounter Principal Discharge DX:	Wrist sprain, left, initial encounter

## 2019-11-10 NOTE — ED PROVIDER NOTE - ATTENDING CONTRIBUTION TO CARE
Dr. Gallardo (Attending Physician)  I performed a history and physical exam of the patient and discussed their management with the resident. I reviewed the resident's note and agree with the documented findings and plan of care. My medical decision making and observations are found above.

## 2019-11-10 NOTE — ED PROVIDER NOTE - CLINICAL SUMMARY MEDICAL DECISION MAKING FREE TEXT BOX
50 yo F with DM and HTn presents after falling yesterday at 9 am now with lumbar back pain and left wrist. Nexus criteria 0. Will provide analgesia and xray. Will re-sb Dr. Gallardo (Attending Physician)  48 yo F with DM and HTN presents after falling yesterday at 9 am now with lumbar back pain and left wrist. Nexus criteria 0. Will provide analgesia and xray. Will re-eval.

## 2019-11-10 NOTE — ED PROVIDER NOTE - PATIENT PORTAL LINK FT
You can access the FollowMyHealth Patient Portal offered by [facility] by registering at the following website: [email or web address]. By joining [employer]’s FollowMyHealth portal, you will also be able to view your health information using other applications (apps) compatible with our system. You can access the FollowMyHealth Patient Portal offered by [facility] by registering at the following website: [email or web address]. By joining [employer]’s FollowMyHealth portal, you will also be able to view your health information using other applications (apps) compatible with our system.

## 2019-11-11 ENCOUNTER — APPOINTMENT (OUTPATIENT)
Dept: INTERNAL MEDICINE | Facility: CLINIC | Age: 49
End: 2019-11-11
Payer: COMMERCIAL

## 2019-11-11 ENCOUNTER — FORM ENCOUNTER (OUTPATIENT)
Age: 49
End: 2019-11-11

## 2019-11-11 VITALS
HEIGHT: 66 IN | SYSTOLIC BLOOD PRESSURE: 140 MMHG | DIASTOLIC BLOOD PRESSURE: 90 MMHG | WEIGHT: 263 LBS | BODY MASS INDEX: 42.27 KG/M2 | OXYGEN SATURATION: 98 % | HEART RATE: 83 BPM

## 2019-11-11 PROCEDURE — 99214 OFFICE O/P EST MOD 30 MIN: CPT

## 2019-11-11 NOTE — REVIEW OF SYSTEMS
[Joint Pain] : no joint pain [Joint Stiffness] : no joint stiffness [Muscle Weakness] : no muscle weakness [Joint Swelling] : no joint swelling [Back Pain] : back pain [Muscle Pain] : no muscle pain [Negative] : Respiratory

## 2019-11-11 NOTE — ASSESSMENT
[FreeTextEntry1] : Patient is a 49-year-old female with history of hypertension and cardiomyopathy who presents for complaint of low back pain after fall\par \par 1 lower back pain\par most likely second musculoskeletal soft tissue injury possible hematoma\par warm soaks stretching\par start Advil three tablets 200 mg Q8 hours and muscle relaxing 10 mg Flexeril three times a day ones of sedation\par check x-ray lumbar spine rule out fracture\par may use oxycodone for breakthrough pain\par letter for work not to work until reevaluate in next week.\par \par 2. Hypertension\par continue amlodipine pain may elevated blood pressure\par observe for now

## 2019-11-11 NOTE — PHYSICAL EXAM
[Normal] : no joint swelling and grossly normal strength and tone [No CVA Tenderness] : no CVA  tenderness [de-identified] : Lumbar tenderness negative straight leg raise strength five out of five sensory intact

## 2019-11-11 NOTE — HISTORY OF PRESENT ILLNESS
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

## 2019-11-12 ENCOUNTER — OUTPATIENT (OUTPATIENT)
Dept: OUTPATIENT SERVICES | Facility: HOSPITAL | Age: 49
LOS: 1 days | End: 2019-11-12
Payer: COMMERCIAL

## 2019-11-12 DIAGNOSIS — M79.18 MYALGIA, OTHER SITE: ICD-10-CM

## 2019-11-12 PROCEDURE — 72100 X-RAY EXAM L-S SPINE 2/3 VWS: CPT

## 2019-11-12 PROCEDURE — 72100 X-RAY EXAM L-S SPINE 2/3 VWS: CPT | Mod: 26

## 2019-11-15 ENCOUNTER — APPOINTMENT (OUTPATIENT)
Dept: INTERNAL MEDICINE | Facility: CLINIC | Age: 49
End: 2019-11-15
Payer: COMMERCIAL

## 2019-11-15 VITALS
DIASTOLIC BLOOD PRESSURE: 80 MMHG | WEIGHT: 265 LBS | SYSTOLIC BLOOD PRESSURE: 120 MMHG | HEART RATE: 90 BPM | HEIGHT: 66 IN | BODY MASS INDEX: 42.59 KG/M2 | OXYGEN SATURATION: 99 %

## 2019-11-15 DIAGNOSIS — R09.81 NASAL CONGESTION: ICD-10-CM

## 2019-11-15 PROCEDURE — 99214 OFFICE O/P EST MOD 30 MIN: CPT

## 2019-11-15 NOTE — HISTORY OF PRESENT ILLNESS
[FreeTextEntry1] : f/up visit [de-identified] : 50 yo female with h/o as below here for f/up from earlier this week, had injured back and then fell on her back, had a lot of lower back pain, xray nl, given flexeril, back pain not too bad now, improved with flexeril, pain now 3 out of 10, was much worse before.  Also wearing back brace.  Would like to go back to work.  No numbness/tingling in legs, no LE weakness.  No other active issues.  Needs refill on flonase for nasal congestion.

## 2019-11-15 NOTE — PHYSICAL EXAM
[No Acute Distress] : no acute distress [Well Developed] : well developed [Well Nourished] : well nourished [Well-Appearing] : well-appearing [Supple] : supple [No Respiratory Distress] : no respiratory distress  [No Spinal Tenderness] : no spinal tenderness [de-identified] : no paraspinal tenderness, notes slight discomfort left sacral region [de-identified] : 5/5 strength LE b/l

## 2019-11-15 NOTE — ASSESSMENT
[FreeTextEntry1] : 48 yo female with h/o as above including HTN and obesity here for f/up visit for back pain from muscle strain, now much improved on flexeril and with back brace, note given to return to work.  RTO prn, will f/up few weeks weight check.

## 2019-11-25 ENCOUNTER — APPOINTMENT (OUTPATIENT)
Dept: INTERNAL MEDICINE | Facility: CLINIC | Age: 49
End: 2019-11-25

## 2019-12-09 NOTE — ED ADULT TRIAGE NOTE - TEMPERATURE IN CELSIUS (DEGREES C)
Subjective:   Patient ID:  Bryant King is a 71 y.o. male who presents for follow-up of CVD    HPI: The patient is here for CAD.Patient is here for valvular heart disease.     The patient has no chest pain, SOB, TIA, palpitations, syncope or pre-syncope.Patient does not exercise quite as much as directed but quite active.        Review of Systems   Constitution: Negative for chills, decreased appetite, diaphoresis, fever, malaise/fatigue, night sweats, weight gain and weight loss.   HENT: Negative for congestion, hoarse voice, nosebleeds, sore throat and tinnitus.    Eyes: Negative for blurred vision, double vision, vision loss in left eye, vision loss in right eye, visual disturbance and visual halos.   Cardiovascular: Negative for chest pain, claudication, cyanosis, dyspnea on exertion, irregular heartbeat, leg swelling, near-syncope, orthopnea, palpitations, paroxysmal nocturnal dyspnea and syncope.   Respiratory: Negative for cough, hemoptysis, shortness of breath, sleep disturbances due to breathing, snoring, sputum production and wheezing.    Endocrine: Negative for cold intolerance, heat intolerance, polydipsia, polyphagia and polyuria.   Hematologic/Lymphatic: Negative for adenopathy and bleeding problem. Does not bruise/bleed easily.   Skin: Negative for color change, dry skin, flushing, itching, nail changes, poor wound healing, rash, skin cancer, suspicious lesions and unusual hair distribution.   Musculoskeletal: Negative for arthritis, back pain, falls, gout, joint pain, joint swelling, muscle cramps, muscle weakness, myalgias and stiffness.   Gastrointestinal: Negative for abdominal pain, anorexia, change in bowel habit, constipation, diarrhea, dysphagia, heartburn, hematemesis, hematochezia, melena and vomiting.   Genitourinary: Negative for decreased libido, dysuria, hematuria, hesitancy and urgency.   Neurological: Negative for excessive daytime sleepiness, dizziness, focal weakness, headaches,  "light-headedness, loss of balance, numbness, paresthesias, seizures, sensory change, tremors, vertigo and weakness.   Psychiatric/Behavioral: Negative for altered mental status, depression, hallucinations, memory loss, substance abuse and suicidal ideas. The patient does not have insomnia and is not nervous/anxious.    Allergic/Immunologic: Negative for environmental allergies and hives.       Objective: /83 (BP Location: Left arm, Patient Position: Sitting, BP Method: Large (Automatic))   Pulse 60   Ht 5' 8.5" (1.74 m)   Wt 91.9 kg (202 lb 9.6 oz)   BMI 30.36 kg/m²      Physical Exam   Constitutional: He is oriented to person, place, and time. He appears well-developed and well-nourished. No distress.   HENT:   Head: Normocephalic.   Eyes: Pupils are equal, round, and reactive to light. EOM are normal.   Neck: Normal range of motion. No thyromegaly present.   Cardiovascular: Normal rate, regular rhythm and intact distal pulses. Exam reveals no gallop and no friction rub.   Murmur heard.   Harsh midsystolic murmur is present with a grade of 1/6 at the upper right sternal border radiating to the neck.  Pulses:       Carotid pulses are 3+ on the right side, and 3+ on the left side.       Radial pulses are 3+ on the right side, and 3+ on the left side.        Femoral pulses are 3+ on the right side, and 3+ on the left side.       Popliteal pulses are 3+ on the right side, and 3+ on the left side.        Dorsalis pedis pulses are 3+ on the right side, and 3+ on the left side.        Posterior tibial pulses are 3+ on the right side, and 3+ on the left side.   Pulmonary/Chest: Effort normal and breath sounds normal. No respiratory distress. He has no wheezes. He has no rales. He exhibits no tenderness.   Abdominal: Soft. He exhibits no distension and no mass. There is no tenderness.   Musculoskeletal: Normal range of motion.   Lymphadenopathy:     He has no cervical adenopathy.   Neurological: He is alert and " oriented to person, place, and time.   Skin: Skin is warm. He is not diaphoretic. No cyanosis. Nails show no clubbing.   Psychiatric: He has a normal mood and affect. His speech is normal and behavior is normal. Judgment and thought content normal. Cognition and memory are normal.       Assessment:     1. Coronary artery disease involving native coronary artery of native heart without angina pectoris    2. S/P CABG x 1    3. S/P AVR (aortic valve replacement)    4. Overweight (BMI 25.0-29.9)    5. HTN (hypertension), benign    6. Dyslipidemia    7. Impaired fasting blood sugar    8. Diabetes mellitus without complication        Plan:   Discussed diet , achieving and maintaining ideal body weight, and exercise.   We reviewed meds in detail.  Reassured-Discussed goals, options, plan.  We have discussed the need for endocarditis prophylaxis.             Bryant was seen today for coronary artery disease involving native coronary artery of .    Diagnoses and all orders for this visit:    Coronary artery disease involving native coronary artery of native heart without angina pectoris  -     Lipid panel; Future; Expected date: 02/10/2021  -     Comprehensive metabolic panel; Future; Expected date: 02/10/2021  -     TSH; Future; Expected date: 02/10/2021  -     PSA, Screening; Future; Expected date: 02/10/2021  -     Hemoglobin A1c; Future; Expected date: 02/10/2021  -     EKG 12-lead; Future; Expected date: 02/10/2021    S/P CABG x 1  -     Lipid panel; Future; Expected date: 02/10/2021  -     Comprehensive metabolic panel; Future; Expected date: 02/10/2021  -     TSH; Future; Expected date: 02/10/2021  -     EKG 12-lead; Future; Expected date: 02/10/2021    S/P AVR (aortic valve replacement)  -     EKG 12-lead; Future; Expected date: 02/10/2021  -     amoxicillin (AMOXIL) 500 MG Tab; Take 1 tablet (500 mg total) by mouth every 12 (twelve) hours. 4 pills one hour before dental. for 10 days    Overweight (BMI 25.0-29.9)  -      TSH; Future; Expected date: 02/10/2021    HTN (hypertension), benign  -     Lipid panel; Future; Expected date: 02/10/2021  -     Comprehensive metabolic panel; Future; Expected date: 02/10/2021  -     TSH; Future; Expected date: 02/10/2021  -     EKG 12-lead; Future; Expected date: 02/10/2021    Dyslipidemia  -     Lipid panel; Future; Expected date: 02/10/2021  -     Comprehensive metabolic panel; Future; Expected date: 02/10/2021  -     TSH; Future; Expected date: 02/10/2021    Impaired fasting blood sugar  -     Hemoglobin A1c; Future; Expected date: 02/10/2021    Diabetes mellitus without complication  -     Hemoglobin A1c; Future; Expected date: 02/10/2021    Other orders  -     ALPRAZolam (XANAX) 0.5 MG tablet; Take 0.5 mg by mouth 2 (two) times daily as needed.   -     cetirizine (ZYRTEC) 10 MG tablet; Take 10 mg by mouth daily as needed.   -     fluticasone propionate (FLONASE) 50 mcg/actuation nasal spray; 1 spray by Nasal route daily as needed.   -     omega-3 fatty acids/fish oil (FISH OIL-OMEGA-3 FATTY ACIDS) 300-1,000 mg capsule; Take by mouth once daily.            Follow up in about 15 months (around 3/10/2021) for with ECG and labs.   36.8

## 2020-01-13 ENCOUNTER — APPOINTMENT (OUTPATIENT)
Dept: INTERNAL MEDICINE | Facility: CLINIC | Age: 50
End: 2020-01-13

## 2020-02-26 ENCOUNTER — APPOINTMENT (OUTPATIENT)
Dept: INTERNAL MEDICINE | Facility: CLINIC | Age: 50
End: 2020-02-26

## 2020-02-26 DIAGNOSIS — F43.10 POST-TRAUMATIC STRESS DISORDER, UNSPECIFIED: ICD-10-CM

## 2020-03-10 ENCOUNTER — APPOINTMENT (OUTPATIENT)
Dept: INTERNAL MEDICINE | Facility: CLINIC | Age: 50
End: 2020-03-10
Payer: COMMERCIAL

## 2020-03-10 VITALS — DIASTOLIC BLOOD PRESSURE: 90 MMHG | SYSTOLIC BLOOD PRESSURE: 130 MMHG

## 2020-03-10 VITALS
DIASTOLIC BLOOD PRESSURE: 90 MMHG | BODY MASS INDEX: 42.11 KG/M2 | HEIGHT: 66 IN | OXYGEN SATURATION: 99 % | WEIGHT: 262 LBS | SYSTOLIC BLOOD PRESSURE: 140 MMHG | HEART RATE: 69 BPM

## 2020-03-10 PROCEDURE — 99213 OFFICE O/P EST LOW 20 MIN: CPT

## 2020-03-10 RX ORDER — FLUTICASONE PROPIONATE 50 UG/1
50 SPRAY, METERED NASAL DAILY
Qty: 1 | Refills: 5 | Status: DISCONTINUED | COMMUNITY
Start: 2019-11-15 | End: 2020-03-10

## 2020-03-10 RX ORDER — OMEPRAZOLE MAGNESIUM 20 MG/1
TABLET, DELAYED RELEASE ORAL
Refills: 0 | Status: DISCONTINUED | COMMUNITY
End: 2020-03-10

## 2020-03-10 RX ORDER — AMLODIPINE BESYLATE 2.5 MG/1
2.5 TABLET ORAL
Qty: 90 | Refills: 3 | Status: DISCONTINUED | COMMUNITY
Start: 2018-03-16 | End: 2020-03-10

## 2020-03-10 RX ORDER — CYCLOBENZAPRINE HYDROCHLORIDE 10 MG/1
10 TABLET, FILM COATED ORAL 3 TIMES DAILY
Qty: 15 | Refills: 1 | Status: DISCONTINUED | COMMUNITY
Start: 2019-11-11 | End: 2020-03-10

## 2020-03-28 NOTE — ASSESSMENT
[FreeTextEntry1] : 50 yo female with h/o as above including HTN and obesity here for f/up visit.\par 1.  CV - bp elevated, will increase norvasc to 5 mg and recheck bp few months\par 2.  Gyn - rx mammo given as due and for "tingling" sensation on rare occasion\par 3.  Derm - advised regular moisturizers to feet (likely dry skin caused cracking), neosporin with bandages acutely to promote quick healing, no evidence of infection currently\par 4.  RTO few months bp check and cpe

## 2020-03-28 NOTE — PHYSICAL EXAM
[No Acute Distress] : no acute distress [Well Nourished] : well nourished [Well Developed] : well developed [Well-Appearing] : well-appearing [Supple] : supple [No Respiratory Distress] : no respiratory distress  [No Accessory Muscle Use] : no accessory muscle use [Clear to Auscultation] : lungs were clear to auscultation bilaterally [Normal Rate] : normal rate  [Regular Rhythm] : with a regular rhythm [Normal S1, S2] : normal S1 and S2 [No Murmur] : no murmur heard [No Edema] : there was no peripheral edema [de-identified] : 1-2 cm crack/laceration left heel, no surrounding erythema, edema, no drainage from area

## 2020-03-28 NOTE — HISTORY OF PRESENT ILLNESS
[FreeTextEntry1] : bp check [de-identified] : 48 yo female with h/o as below here for f/up visit.\josé miguel Since last visit, had seen Danielle just once for counseling, gave some suggestions for other job opportunities.  \par Sister from [place] staying with her, brother calls her, so she has more support, mood has been better.\par Had crack in heel few weeks ago, became painful 1 week ago.  No fevers/chills, no drainage from left heel.  Using peroxide to clean area.\par Slight HA but not for long, taking tylenol and will resolve or motrin and will resolve.  No dizziness or lightheadedness, no chest pain, no shortness of breath.  Checks bp at home, bp has been 140/100.  Doesn't cook with salt.\par Has slight tingling on rare occasion around left nipple, no pain.  Tends to get tingling during menses.  Hasn't had mammo in long time.\par No other active issues.

## 2020-04-16 ENCOUNTER — FORM ENCOUNTER (OUTPATIENT)
Age: 50
End: 2020-04-16

## 2020-07-22 ENCOUNTER — APPOINTMENT (OUTPATIENT)
Dept: INTERNAL MEDICINE | Facility: CLINIC | Age: 50
End: 2020-07-22
Payer: COMMERCIAL

## 2020-07-22 VITALS
WEIGHT: 260 LBS | BODY MASS INDEX: 41.78 KG/M2 | DIASTOLIC BLOOD PRESSURE: 85 MMHG | OXYGEN SATURATION: 99 % | SYSTOLIC BLOOD PRESSURE: 120 MMHG | HEIGHT: 66 IN | HEART RATE: 83 BPM

## 2020-07-22 PROCEDURE — 99396 PREV VISIT EST AGE 40-64: CPT

## 2020-07-22 RX ORDER — METFORMIN HYDROCHLORIDE 500 MG/1
500 TABLET, COATED ORAL DAILY
Qty: 90 | Refills: 3 | Status: DISCONTINUED | COMMUNITY
Start: 2019-06-11 | End: 2020-07-22

## 2020-07-22 NOTE — PAST MEDICAL HISTORY
[Menstruating] : menstruating [Regular Cycle Intervals] : have been regular [Excessive Bleeding] : there was excessive bleeding [Total Preg ___] : G[unfilled] [AB Spont ___] : miscarriages: [unfilled]  [Full Term ___] : Full Term: [unfilled]

## 2020-08-08 NOTE — HISTORY OF PRESENT ILLNESS
[FreeTextEntry1] : physical [de-identified] : 51 yo female with h/o as below here for CPE.\par Muscle spasms in the morning, sometimes hands will cramp.  \par Otherwise feeling well overall.\par Lost some weight, more active because of new job, mood is better now that she is working again.\par No other active issues.  \par

## 2020-08-08 NOTE — PHYSICAL EXAM
[No Acute Distress] : no acute distress [Well Nourished] : well nourished [Well-Appearing] : well-appearing [Well Developed] : well developed [Normal TMs] : both tympanic membranes were normal [PERRL] : pupils equal round and reactive to light [Normal Oropharynx] : the oropharynx was normal [Thyroid Normal, No Nodules] : the thyroid was normal and there were no nodules present [Supple] : supple [No Lymphadenopathy] : no lymphadenopathy [No Respiratory Distress] : no respiratory distress  [No Accessory Muscle Use] : no accessory muscle use [Clear to Auscultation] : lungs were clear to auscultation bilaterally [Normal Rate] : normal rate  [Regular Rhythm] : with a regular rhythm [Normal S1, S2] : normal S1 and S2 [No Carotid Bruits] : no carotid bruits [No Murmur] : no murmur heard [Pedal Pulses Present] : the pedal pulses are present [Normal Appearance] : normal in appearance [No Edema] : there was no peripheral edema [No Nipple Discharge] : no nipple discharge [No Axillary Lymphadenopathy] : no axillary lymphadenopathy [Non Tender] : non-tender [Non-distended] : non-distended [Soft] : abdomen soft [No Masses] : no abdominal mass palpated [No HSM] : no HSM [Normal Bowel Sounds] : normal bowel sounds [Normal Supraclavicular Nodes] : no supraclavicular lymphadenopathy [Normal Axillary Nodes] : no axillary lymphadenopathy [Normal Posterior Cervical Nodes] : no posterior cervical lymphadenopathy [Normal Anterior Cervical Nodes] : no anterior cervical lymphadenopathy [Normal Inguinal Nodes] : no inguinal lymphadenopathy [Normal Gait] : normal gait [No Rash] : no rash [No Joint Swelling] : no joint swelling [Normal Affect] : the affect was normal

## 2020-08-08 NOTE — ASSESSMENT
[FreeTextEntry1] : 49 yo female with h/o as above including morbid obesity, HTN, preDM, GERD here for CPE.\par 1.  CV - bp at goal, check lipids, reinforced diet/exercise\par 2.  MSK - check lytes given muscle cramps\par 3.  Gyn - to see gyn for pap as due, rx mammo given as due\par 4.  GI - due next year for colonoscopy\par 5.  HCM - check below labs; vaccines utd\par 6.  RTO prn or 6 months bp check\par \par

## 2020-08-08 NOTE — REVIEW OF SYSTEMS
[Recent Change In Weight] : ~T recent weight change [Negative] : ENT [Fever] : no fever [Chills] : no chills [Fatigue] : no fatigue [Chest Pain] : no chest pain [Palpitations] : no palpitations [Shortness Of Breath] : no shortness of breath [Cough] : no cough [Nausea] : no nausea [Constipation] : no constipation [Abdominal Pain] : no abdominal pain [Vomiting] : no vomiting [Heartburn] : no heartburn [Diarrhea] : diarrhea [Vaginal Discharge] : no vaginal discharge [Dysuria] : no dysuria [Melena] : no melena [Anxiety] : no anxiety [Fainting] : no fainting [Dizziness] : no dizziness [Skin Rash] : no skin rash [Depression] : no depression [Easy Bleeding] : no easy bleeding [Easy Bruising] : no easy bruising [FreeTextEntry3] : has optho but needs to see [Swollen Glands] : no swollen glands [FreeTextEntry9] : muscle spasms

## 2021-01-19 ENCOUNTER — APPOINTMENT (OUTPATIENT)
Dept: INTERNAL MEDICINE | Facility: CLINIC | Age: 51
End: 2021-01-19
Payer: COMMERCIAL

## 2021-01-19 PROCEDURE — 99442: CPT

## 2021-01-19 RX ORDER — OMEPRAZOLE MAGNESIUM 20 MG/1
TABLET, DELAYED RELEASE ORAL
Refills: 0 | Status: DISCONTINUED | COMMUNITY
End: 2021-01-19

## 2021-01-19 NOTE — ASSESSMENT
[FreeTextEntry1] : 49 yo female with h/o as above including HTN and preDM here for urinary frequency/dysuria past few days, suspect has UTI, unable to give urine sample for a few days so will empirically treat with macrobid and advised to call back if no improvement or symptoms don't fully resolve.  Advised to check pregnancy test as well given missed menses and atypical menses this month.  RTO prn.

## 2021-01-19 NOTE — REVIEW OF SYSTEMS
[Chills] : chills [Back Pain] : back pain [Headache] : headache [Negative] : Heme/Lymph [FreeTextEntry8] : see hpi

## 2021-01-19 NOTE — HISTORY OF PRESENT ILLNESS
[Home] : at home, [unfilled] , at the time of the visit. [Other Location: e.g. Home (Enter Location, City,State)___] : at [unfilled] [Verbal consent obtained from patient] : the patient, [unfilled] [FreeTextEntry8] : 51 yo female with h/o as below here for telephone visit for urinary symptoms.\par Having swelling in right foot, went to podiatrist, had xray, waiting on ultrasound (awaiting in a few days) and to do bloodwork.\par Also has overactive bladder, urinating frequently, pain with urination since Saturday.  +urgency, feels has to urinate every 5-10 minutes, feels like bladder isn't emptying, urinating only little bit at a time, had to wear a pad at work as couldn't keep running to the bathroom.  No hematuria, not foul-smelling.  Having chills, no fever.  Having pain in lower back, not having pain in mid-back.  No abdominal pain.  No nausea/vomiting.  LMP 1/2/21, no chance of pregnancy, missed menses the month of December.  Had lighter menses in 1/2/21.  Yesterday started Azo for bladder symptoms.\par Recently having HA also.  No known COVID exposures.  No nasal congestion, no coughing, no sore throat, no myalgias.  \par No other active issues.

## 2021-02-24 ENCOUNTER — RX RENEWAL (OUTPATIENT)
Age: 51
End: 2021-02-24

## 2021-04-12 ENCOUNTER — APPOINTMENT (OUTPATIENT)
Dept: INTERNAL MEDICINE | Facility: CLINIC | Age: 51
End: 2021-04-12
Payer: COMMERCIAL

## 2021-04-12 ENCOUNTER — NON-APPOINTMENT (OUTPATIENT)
Age: 51
End: 2021-04-12

## 2021-04-12 ENCOUNTER — TRANSCRIPTION ENCOUNTER (OUTPATIENT)
Age: 51
End: 2021-04-12

## 2021-04-12 PROCEDURE — 99442: CPT

## 2021-04-12 NOTE — PLAN
[FreeTextEntry1] : 49 yo female with h/o as above including obesity and HTN here for phone visit for COVID infection, day 8 today, not feeling any better, still having cough and SOB.  Advised mucinex, steam showers, fluids, mucinex, proning, to get pulse ox overnighted through [facility] and will check pulse ox at home and to go to ER if severe SOB or if pulse ox < 92%.  Rx albuterol MDI given if needed.  Will arrange for covid ab infusion.  Will follow closely.

## 2021-04-12 NOTE — HISTORY OF PRESENT ILLNESS
[Medical Office: ([facility])___] : at the medical office located in  [Verbal consent obtained from patient] : the patient, [unfilled] [FreeTextEntry1] : 49 yo female with h/o as below with COVID, had rapid covid test today at urgent care that was positive but started having symptoms maybe 7-8 days ago.  \par Cough is bothering her a lot, taking tylenol and nyquil, not helping her, just helps her sleep.  Feels cough from the chest.  Some shortness of breath at times and chest pain, but when went to urgent care a little while ago reports pulse ox was checked (wasn't told was abnl), had CXR that was reportedly nl and told chest pain just from COVID.  Feels chest pain with coughing, some SOB with ambulation.  Hasn't been taking anything for the cough.  Also having HA on and off but taking tylenol.  No other active issues.  Didn't get covid vaccine.     [Time Spent: ___ minutes] : I have spent [unfilled] minutes with the patient on the telephone

## 2021-04-13 ENCOUNTER — OUTPATIENT (OUTPATIENT)
Dept: OUTPATIENT SERVICES | Facility: HOSPITAL | Age: 51
LOS: 1 days | End: 2021-04-13
Payer: COMMERCIAL

## 2021-04-13 ENCOUNTER — APPOINTMENT (OUTPATIENT)
Dept: DISASTER EMERGENCY | Facility: HOSPITAL | Age: 51
End: 2021-04-13

## 2021-04-13 VITALS
HEART RATE: 83 BPM | WEIGHT: 255.07 LBS | RESPIRATION RATE: 16 BRPM | DIASTOLIC BLOOD PRESSURE: 78 MMHG | SYSTOLIC BLOOD PRESSURE: 115 MMHG | OXYGEN SATURATION: 100 % | HEIGHT: 67 IN | TEMPERATURE: 98 F

## 2021-04-13 VITALS
HEART RATE: 68 BPM | RESPIRATION RATE: 20 BRPM | SYSTOLIC BLOOD PRESSURE: 113 MMHG | DIASTOLIC BLOOD PRESSURE: 74 MMHG | TEMPERATURE: 98 F | OXYGEN SATURATION: 100 %

## 2021-04-13 DIAGNOSIS — U07.1 COVID-19: ICD-10-CM

## 2021-04-13 PROCEDURE — M0239: CPT

## 2021-04-13 RX ORDER — SODIUM CHLORIDE 9 MG/ML
250 INJECTION INTRAMUSCULAR; INTRAVENOUS; SUBCUTANEOUS
Refills: 0 | Status: DISCONTINUED | OUTPATIENT
Start: 2021-04-13 | End: 2021-04-27

## 2021-04-13 RX ADMIN — SODIUM CHLORIDE 25 MILLILITER(S): 9 INJECTION INTRAMUSCULAR; INTRAVENOUS; SUBCUTANEOUS at 11:27

## 2021-04-13 NOTE — CHART NOTE - NSCHARTNOTEFT_GEN_A_CORE
CC: Monoclonal Antibody Infusion/COVID 19 Positive on 4/12/21      History: Patient presents for infusion of monoclonal antiboty infusion. Patient has been screened and was deemed to be a candidate.    Symptoms/ Criteria: Cough, HA, intermittent SOB    Risk Profile includes: BMI>35, HTN    casirivimab/imdevimab in sodium chloride 0.9% (EUA) IVPB 250 milliLiter(s) IV Intermittent once  sodium chloride 0.9%. 250 milliLiter(s) IV Continuous <Continuous>      PMHx:  Infection due to severe acute respiratory syndrome coronavirus 2 (SARS-CoV-2)    COVID-19          T(F): 97.7  HR: 78  BP: 115/78  RR: 20  SpO2: 100%      PE:   Appearance: NAD	  HEENT:   Normal oral mucosa.   Lymphatic: No lymphadenopathy  Cardiovascular: Normal S1 S2, No JVD, No murmurs, No edema  Respiratory: Lungs clear to auscultation	  Gastrointestinal:  Soft, Non-tender. No guarding   Skin: warm and dry  Neurologic: Non-focal  Extremities: Normal range of motion.     ASSESSMENT:  Pt is a 50y year old Female Covid +  referred to the infusion center for Monoclonal antibody infusion (Regeneron).  Of note pt had a tubal ligation 15 years ago-cannot be pregnant -pregnancy waiver signed      PLAN:  - infusion procedure explained to patient   - Consent for monoclonal antibody infusion obtained   - Risk & benifits discussed/all questions answered  - infuse Regeneron over one hour   - will observe patient for one hour post infusion  and then if stable discharge home with oupt follow up as planned by PMD.    POST INFUSION ASSESSMENT:   DISCHARGE at approximately 1  hour post infusion    - Patient tolerated infusion well denies complaints of chest pain/SOB/dizziness/ palps  - VSS for discharge home  - D/C instructions given/ fact sheet included.  - Patient to follow-up with PCP as needed.

## 2021-04-15 ENCOUNTER — TRANSCRIPTION ENCOUNTER (OUTPATIENT)
Age: 51
End: 2021-04-15

## 2021-08-12 ENCOUNTER — RX RENEWAL (OUTPATIENT)
Age: 51
End: 2021-08-12

## 2021-11-10 NOTE — ED PROVIDER NOTE - CHILD ABUSE FACILITY
Detail Level: Detailed Quality 431: Preventive Care And Screening: Unhealthy Alcohol Use - Screening: Patient screened for unhealthy alcohol use using a single question and scores less than 2 times per year Quality 130: Documentation Of Current Medications In The Medical Record: Current Medications Documented Quality 226: Preventive Care And Screening: Tobacco Use: Screening And Cessation Intervention: Patient screened for tobacco use and is an ex/non-smoker [facility] Quality 431: Preventive Care And Screening: Unhealthy Alcohol Use - Screening: Patient not identified as an unhealthy alcohol user when screened for unhealthy alcohol use using a systematic screening method

## 2022-02-04 ENCOUNTER — NON-APPOINTMENT (OUTPATIENT)
Age: 52
End: 2022-02-04

## 2022-02-05 ENCOUNTER — RX RENEWAL (OUTPATIENT)
Age: 52
End: 2022-02-05

## 2022-02-07 ENCOUNTER — APPOINTMENT (OUTPATIENT)
Dept: INTERNAL MEDICINE | Facility: CLINIC | Age: 52
End: 2022-02-07

## 2022-03-04 ENCOUNTER — NON-APPOINTMENT (OUTPATIENT)
Age: 52
End: 2022-03-04

## 2022-03-07 ENCOUNTER — APPOINTMENT (OUTPATIENT)
Dept: INTERNAL MEDICINE | Facility: CLINIC | Age: 52
End: 2022-03-07
Payer: COMMERCIAL

## 2022-03-07 VITALS
HEART RATE: 79 BPM | OXYGEN SATURATION: 96 % | WEIGHT: 245 LBS | DIASTOLIC BLOOD PRESSURE: 80 MMHG | BODY MASS INDEX: 39.37 KG/M2 | HEIGHT: 66 IN | SYSTOLIC BLOOD PRESSURE: 130 MMHG

## 2022-03-07 DIAGNOSIS — M79.18 MYALGIA, OTHER SITE: ICD-10-CM

## 2022-03-07 PROCEDURE — 90686 IIV4 VACC NO PRSV 0.5 ML IM: CPT

## 2022-03-07 PROCEDURE — 99213 OFFICE O/P EST LOW 20 MIN: CPT | Mod: 25

## 2022-03-07 PROCEDURE — G0008: CPT

## 2022-03-07 RX ORDER — NITROFURANTOIN (MONOHYDRATE/MACROCRYSTALS) 25; 75 MG/1; MG/1
100 CAPSULE ORAL TWICE DAILY
Qty: 10 | Refills: 0 | Status: DISCONTINUED | COMMUNITY
Start: 2021-01-19 | End: 2022-03-07

## 2022-03-07 RX ORDER — ALBUTEROL SULFATE 90 UG/1
108 (90 BASE) INHALANT RESPIRATORY (INHALATION)
Qty: 1 | Refills: 0 | Status: DISCONTINUED | COMMUNITY
Start: 2021-04-12 | End: 2022-03-07

## 2022-03-07 NOTE — PHYSICAL EXAM
[No Acute Distress] : no acute distress [Well Nourished] : well nourished [Well Developed] : well developed [Well-Appearing] : well-appearing [Supple] : supple [No Respiratory Distress] : no respiratory distress  [No Accessory Muscle Use] : no accessory muscle use [Clear to Auscultation] : lungs were clear to auscultation bilaterally [Normal Rate] : normal rate  [Regular Rhythm] : with a regular rhythm [Normal S1, S2] : normal S1 and S2 [No Murmur] : no murmur heard [No Spinal Tenderness] : no spinal tenderness [Normal Gait] : normal gait [Normal Affect] : the affect was normal [de-identified] : 1+ pitting edema dorsal feet b/l, trace edema (nonpitting edema) calves b/l [de-identified] : notes discomfort across lower back b/l

## 2022-03-07 NOTE — HISTORY OF PRESENT ILLNESS
[FreeTextEntry1] : bp check [de-identified] : 52 yo female with h/o as below including HTN here for f/up and bp check.\par Has back pain when standing for more than a few minutes, will feel it in lower back, wears a brace.  Does housekeeping so very physically demanding.  Doesn't have time for PT.  \par Also swelling in feet chronically, also swollen in the morning.  \par No SOB but did when she was younger.  No chest pain, no HA, no vision changes (just had eye check and had new glasses), no chest pain, no shortness of breath, no palpitations, no dizziness/lightheadedness.\par Sometimes difficult to fall asleep, even when tired.  Tries to watch tv before bed.  \par Had menses in November but then skipped 2 months and then came back last month.  \par No other active issues.

## 2022-03-07 NOTE — ASSESSMENT
[FreeTextEntry1] : 50 yo female with h/o as above including HTN, preDM, obesity, here for f/up and bp check.\par 1.  CV - bp at goal, c/w current medication, mild edema of feet b/l but otherwise not significant, suspect from norvasc\par 2.  MSK - lower back strain, trial NSAID with food, c/w back brace, trial topical therapies, suggested weight loss to help with strain\par 3.  GI - declining colon cancer screening currently but will consider at next visit\par 4. Gyn - rx mammo given as due; likely in perimenopause\par 5.  Psych/sleep - suggested sleep hygiene and avoiding devices including tv that will activate her at night to help with insomnia\par 6.  HCM - flu shot today; consider labs next visit\par 7.  RTO few months cpe\par \par \par

## 2022-03-21 ENCOUNTER — APPOINTMENT (OUTPATIENT)
Dept: MAMMOGRAPHY | Facility: IMAGING CENTER | Age: 52
End: 2022-03-21
Payer: COMMERCIAL

## 2022-03-21 ENCOUNTER — RESULT REVIEW (OUTPATIENT)
Age: 52
End: 2022-03-21

## 2022-03-21 ENCOUNTER — APPOINTMENT (OUTPATIENT)
Dept: ULTRASOUND IMAGING | Facility: IMAGING CENTER | Age: 52
End: 2022-03-21
Payer: COMMERCIAL

## 2022-03-21 ENCOUNTER — OUTPATIENT (OUTPATIENT)
Dept: OUTPATIENT SERVICES | Facility: HOSPITAL | Age: 52
LOS: 1 days | End: 2022-03-21
Payer: COMMERCIAL

## 2022-03-21 DIAGNOSIS — Z00.8 ENCOUNTER FOR OTHER GENERAL EXAMINATION: ICD-10-CM

## 2022-03-21 PROCEDURE — 77067 SCR MAMMO BI INCL CAD: CPT | Mod: 26

## 2022-03-21 PROCEDURE — 77067 SCR MAMMO BI INCL CAD: CPT

## 2022-03-21 PROCEDURE — 77063 BREAST TOMOSYNTHESIS BI: CPT | Mod: 26

## 2022-03-21 PROCEDURE — 77063 BREAST TOMOSYNTHESIS BI: CPT

## 2022-03-25 ENCOUNTER — EMERGENCY (EMERGENCY)
Facility: HOSPITAL | Age: 52
LOS: 1 days | Discharge: HOME | End: 2022-03-25
Attending: EMERGENCY MEDICINE | Admitting: EMERGENCY MEDICINE
Payer: COMMERCIAL

## 2022-03-25 VITALS
RESPIRATION RATE: 18 BRPM | HEART RATE: 86 BPM | DIASTOLIC BLOOD PRESSURE: 61 MMHG | SYSTOLIC BLOOD PRESSURE: 106 MMHG | HEIGHT: 67 IN | OXYGEN SATURATION: 99 % | TEMPERATURE: 98 F

## 2022-03-25 VITALS
DIASTOLIC BLOOD PRESSURE: 81 MMHG | TEMPERATURE: 98 F | RESPIRATION RATE: 17 BRPM | SYSTOLIC BLOOD PRESSURE: 123 MMHG | OXYGEN SATURATION: 100 % | HEART RATE: 77 BPM

## 2022-03-25 LAB
ALBUMIN SERPL ELPH-MCNC: 3.9 G/DL — SIGNIFICANT CHANGE UP (ref 3.3–5)
ALP SERPL-CCNC: 63 U/L — SIGNIFICANT CHANGE UP (ref 40–120)
ALT FLD-CCNC: 19 U/L — SIGNIFICANT CHANGE UP (ref 4–33)
ANION GAP SERPL CALC-SCNC: 12 MMOL/L — SIGNIFICANT CHANGE UP (ref 7–14)
APPEARANCE UR: CLEAR — SIGNIFICANT CHANGE UP
APTT BLD: 27.5 SEC — SIGNIFICANT CHANGE UP (ref 27–36.3)
AST SERPL-CCNC: 27 U/L — SIGNIFICANT CHANGE UP (ref 4–32)
BASE EXCESS BLDV CALC-SCNC: 0.3 MMOL/L — SIGNIFICANT CHANGE UP (ref -2–3)
BASOPHILS # BLD AUTO: 0.02 K/UL — SIGNIFICANT CHANGE UP (ref 0–0.2)
BASOPHILS NFR BLD AUTO: 0.3 % — SIGNIFICANT CHANGE UP (ref 0–2)
BILIRUB SERPL-MCNC: 0.8 MG/DL — SIGNIFICANT CHANGE UP (ref 0.2–1.2)
BILIRUB UR-MCNC: NEGATIVE — SIGNIFICANT CHANGE UP
BLOOD GAS VENOUS COMPREHENSIVE RESULT: SIGNIFICANT CHANGE UP
BUN SERPL-MCNC: 17 MG/DL — SIGNIFICANT CHANGE UP (ref 7–23)
CALCIUM SERPL-MCNC: 8.9 MG/DL — SIGNIFICANT CHANGE UP (ref 8.4–10.5)
CHLORIDE BLDV-SCNC: 106 MMOL/L — SIGNIFICANT CHANGE UP (ref 96–108)
CHLORIDE SERPL-SCNC: 105 MMOL/L — SIGNIFICANT CHANGE UP (ref 98–107)
CK SERPL-CCNC: 440 U/L — HIGH (ref 25–170)
CO2 BLDV-SCNC: 26.7 MMOL/L — HIGH (ref 22–26)
CO2 SERPL-SCNC: 22 MMOL/L — SIGNIFICANT CHANGE UP (ref 22–31)
COLOR SPEC: SIGNIFICANT CHANGE UP
CREAT SERPL-MCNC: 0.83 MG/DL — SIGNIFICANT CHANGE UP (ref 0.5–1.3)
DIFF PNL FLD: NEGATIVE — SIGNIFICANT CHANGE UP
EGFR: 86 ML/MIN/1.73M2 — SIGNIFICANT CHANGE UP
EOSINOPHIL # BLD AUTO: 0.04 K/UL — SIGNIFICANT CHANGE UP (ref 0–0.5)
EOSINOPHIL NFR BLD AUTO: 0.7 % — SIGNIFICANT CHANGE UP (ref 0–6)
GAS PNL BLDV: 134 MMOL/L — LOW (ref 136–145)
GAS PNL BLDV: SIGNIFICANT CHANGE UP
GLUCOSE BLDV-MCNC: 119 MG/DL — HIGH (ref 70–99)
GLUCOSE SERPL-MCNC: 124 MG/DL — HIGH (ref 70–99)
GLUCOSE UR QL: NEGATIVE — SIGNIFICANT CHANGE UP
HCO3 BLDV-SCNC: 25 MMOL/L — SIGNIFICANT CHANGE UP (ref 22–29)
HCT VFR BLD CALC: 36.3 % — SIGNIFICANT CHANGE UP (ref 34.5–45)
HCT VFR BLDA CALC: 38 % — SIGNIFICANT CHANGE UP (ref 34.5–46.5)
HGB BLD CALC-MCNC: 12.5 G/DL — SIGNIFICANT CHANGE UP (ref 11.5–15.5)
HGB BLD-MCNC: 11.8 G/DL — SIGNIFICANT CHANGE UP (ref 11.5–15.5)
IANC: 3.34 K/UL — SIGNIFICANT CHANGE UP (ref 1.8–7.4)
IMM GRANULOCYTES NFR BLD AUTO: 0.3 % — SIGNIFICANT CHANGE UP (ref 0–1.5)
INR BLD: 1.07 RATIO — SIGNIFICANT CHANGE UP (ref 0.88–1.16)
KETONES UR-MCNC: NEGATIVE — SIGNIFICANT CHANGE UP
LACTATE BLDV-MCNC: 0.9 MMOL/L — SIGNIFICANT CHANGE UP (ref 0.5–2)
LEUKOCYTE ESTERASE UR-ACNC: NEGATIVE — SIGNIFICANT CHANGE UP
LYMPHOCYTES # BLD AUTO: 1.7 K/UL — SIGNIFICANT CHANGE UP (ref 1–3.3)
LYMPHOCYTES # BLD AUTO: 29.6 % — SIGNIFICANT CHANGE UP (ref 13–44)
MAGNESIUM SERPL-MCNC: 1.7 MG/DL — SIGNIFICANT CHANGE UP (ref 1.6–2.6)
MCHC RBC-ENTMCNC: 28.9 PG — SIGNIFICANT CHANGE UP (ref 27–34)
MCHC RBC-ENTMCNC: 32.5 GM/DL — SIGNIFICANT CHANGE UP (ref 32–36)
MCV RBC AUTO: 88.8 FL — SIGNIFICANT CHANGE UP (ref 80–100)
MONOCYTES # BLD AUTO: 0.63 K/UL — SIGNIFICANT CHANGE UP (ref 0–0.9)
MONOCYTES NFR BLD AUTO: 11 % — SIGNIFICANT CHANGE UP (ref 2–14)
NEUTROPHILS # BLD AUTO: 3.34 K/UL — SIGNIFICANT CHANGE UP (ref 1.8–7.4)
NEUTROPHILS NFR BLD AUTO: 58.1 % — SIGNIFICANT CHANGE UP (ref 43–77)
NITRITE UR-MCNC: NEGATIVE — SIGNIFICANT CHANGE UP
NRBC # BLD: 0 /100 WBCS — SIGNIFICANT CHANGE UP
NRBC # FLD: 0 K/UL — SIGNIFICANT CHANGE UP
NT-PROBNP SERPL-SCNC: 32 PG/ML — SIGNIFICANT CHANGE UP
PCO2 BLDV: 42 MMHG — SIGNIFICANT CHANGE UP (ref 39–42)
PCP SPEC-MCNC: SIGNIFICANT CHANGE UP
PH BLDV: 7.39 — SIGNIFICANT CHANGE UP (ref 7.32–7.43)
PH UR: 6 — SIGNIFICANT CHANGE UP (ref 5–8)
PHOSPHATE SERPL-MCNC: 2.8 MG/DL — SIGNIFICANT CHANGE UP (ref 2.5–4.5)
PLATELET # BLD AUTO: 318 K/UL — SIGNIFICANT CHANGE UP (ref 150–400)
PO2 BLDV: 35 MMHG — SIGNIFICANT CHANGE UP
POTASSIUM BLDV-SCNC: 4.4 MMOL/L — SIGNIFICANT CHANGE UP (ref 3.5–5.1)
POTASSIUM SERPL-MCNC: 4.1 MMOL/L — SIGNIFICANT CHANGE UP (ref 3.5–5.3)
POTASSIUM SERPL-SCNC: 4.1 MMOL/L — SIGNIFICANT CHANGE UP (ref 3.5–5.3)
PROT SERPL-MCNC: 7.4 G/DL — SIGNIFICANT CHANGE UP (ref 6–8.3)
PROT UR-MCNC: NEGATIVE — SIGNIFICANT CHANGE UP
PROTHROM AB SERPL-ACNC: 12.4 SEC — SIGNIFICANT CHANGE UP (ref 10.5–13.4)
RBC # BLD: 4.09 M/UL — SIGNIFICANT CHANGE UP (ref 3.8–5.2)
RBC # FLD: 15.3 % — HIGH (ref 10.3–14.5)
SAO2 % BLDV: 59.1 % — SIGNIFICANT CHANGE UP
SARS-COV-2 RNA SPEC QL NAA+PROBE: SIGNIFICANT CHANGE UP
SODIUM SERPL-SCNC: 139 MMOL/L — SIGNIFICANT CHANGE UP (ref 135–145)
SP GR SPEC: 1.01 — SIGNIFICANT CHANGE UP (ref 1–1.05)
TROPONIN T, HIGH SENSITIVITY RESULT: <6 NG/L — SIGNIFICANT CHANGE UP
UROBILINOGEN FLD QL: SIGNIFICANT CHANGE UP
WBC # BLD: 5.75 K/UL — SIGNIFICANT CHANGE UP (ref 3.8–10.5)
WBC # FLD AUTO: 5.75 K/UL — SIGNIFICANT CHANGE UP (ref 3.8–10.5)

## 2022-03-25 PROCEDURE — 70450 CT HEAD/BRAIN W/O DYE: CPT | Mod: 26,MA

## 2022-03-25 PROCEDURE — 99285 EMERGENCY DEPT VISIT HI MDM: CPT

## 2022-03-25 RX ORDER — ACETAMINOPHEN 500 MG
975 TABLET ORAL ONCE
Refills: 0 | Status: COMPLETED | OUTPATIENT
Start: 2022-03-25 | End: 2022-03-25

## 2022-03-25 RX ORDER — LIDOCAINE 4 G/100G
1 CREAM TOPICAL ONCE
Refills: 0 | Status: COMPLETED | OUTPATIENT
Start: 2022-03-25 | End: 2022-03-25

## 2022-03-25 RX ADMIN — LIDOCAINE 1 PATCH: 4 CREAM TOPICAL at 10:51

## 2022-03-25 RX ADMIN — Medication 975 MILLIGRAM(S): at 10:51

## 2022-03-25 NOTE — ED PROVIDER NOTE - NSFOLLOWUPINSTRUCTIONS_ED_ALL_ED_FT
Please follow up with your primary care physician within 2-3 days. Return to the ER for any new or concerning symptoms.    Follow up with Cardiology, you were provided a referral sheet for cardiology. Please call to set up an appointment.       Muscle Cramps and Spasms      Muscle cramps and spasms occur when a muscle or muscles tighten and you have no control over this tightening (involuntary muscle contraction). They are a common problem and can develop in any muscle. The most common place is in the calf muscles of the leg. Muscle cramps and muscle spasms are both involuntary muscle contractions, but there are some differences between the two:  •Muscle cramps are painful. They come and go and may last for a few seconds or up to 15 minutes. Muscle cramps are often more forceful and last longer than muscle spasms.      •Muscle spasms may or may not be painful. They may also last just a few seconds or much longer.      Certain medical conditions, such as diabetes or Parkinson's disease, can make it more likely to develop cramps or spasms. However, cramps or spasms are usually not caused by a serious underlying problem. Common causes include:  •Doing more physical work or exercise than your body is ready for (overexertion).      •Overuse from repeating certain movements too many times.      •Remaining in a certain position for a long period of time.      •Improper preparation, form, or technique while playing a sport or doing an activity.      •Dehydration.      •Injury.      •Side effects of some medicines.      •Abnormally low levels of the salts and minerals in your blood (electrolytes), especially potassium and calcium. This could happen if you are taking water pills (diuretics) or if you are pregnant.      In many cases, the cause of muscle cramps or spasms is not known.      Follow these instructions at home:      Managing pain and stiffness                   •Try massaging, stretching, and relaxing the affected muscle. Do this for several minutes at a time.    •If directed, apply heat to tight or tense muscles as often as told by your health care provider. Use the heat source that your health care provider recommends, such as a moist heat pack or a heating pad.  •Place a towel between your skin and the heat source.      •Leave the heat on for 20–30 minutes.      •Remove the heat if your skin turns bright red. This is especially important if you are unable to feel pain, heat, or cold. You may have a greater risk of getting burned.      •If directed, put ice on the affected area. This may help if you are sore or have pain after a cramp or spasm.  •Put ice in a plastic bag.      •Place a towel between your skin and the bag.      •Leave the ice on for 20 minutes, 2–3 times a day.        •Try taking hot showers or baths to help relax tight muscles.      Eating and drinking     •Drink enough fluid to keep your urine pale yellow. Staying well hydrated may help prevent cramps or spasms.      •Eat a healthy diet that includes plenty of nutrients to help your muscles function. A healthy diet includes fruits and vegetables, lean protein, whole grains, and low-fat or nonfat dairy products.      General instructions     •If you are having frequent cramps, avoid intense exercise for several days.      •Take over-the-counter and prescription medicines only as told by your health care provider.      •Pay attention to any changes in your symptoms.      •Keep all follow-up visits as told by your health care provider. This is important.        Contact a health care provider if:    •Your cramps or spasms get more severe or happen more often.      •Your cramps or spasms do not improve over time.        Summary    •Muscle cramps and spasms occur when a muscle or muscles tighten and you have no control over this tightening (involuntary muscle contraction).      •The most common place for cramps or spasms to occur is in the calf muscles of the leg.      •Massaging, stretching, and relaxing the affected muscle may relieve the cramp or spasm.      •Drink enough fluid to keep your urine pale yellow. Staying well hydrated may help prevent cramps or spasms

## 2022-03-25 NOTE — ED PROVIDER NOTE - PROGRESS NOTE DETAILS
Nevin Echevarria, PGY-1, EM:  Pt able to walk on their own. No dizziness, no syncope. Nevin Echevarria, PGY-1, EM:  called pt's PMD Roberta Ramachandran, who notes pt's affect is similar to her affect in the office. She will call pt on DC and set up an appointment. Agrees with plan for cardio follow up. Will send pt with copies of her ecg to take to her PMD. Nevin Echevarria, PGY-1, EM:  Pt able to walk on their own. No dizziness, no syncope.    Initial EKG with QTc 508, down to 479 on repeat.

## 2022-03-25 NOTE — ED PROVIDER NOTE - ATTENDING CONTRIBUTION TO CARE
Dr. Prieto: 50 yo female with HTN on amlodipine, in ED with 1 day of left thigh cramp (NO swelling) and then today episode of syncope (pt does not remember) after she attempted to get out of bed.  Per spouse, pt was attempting to get out of bed and had syncopal episode, eyes rolled into back of head and pt fell to ground, no head strike as per  who witnessed event.  After estimated few minutes of unconsciousness pt awoke.  She feels back to baseline now but does not remember this morning's event.  She continues to have left anterior thigh pain, somewhat better after stretching her leg.  Pt works in a very active job, possibly related to her left leg pain.  On exam pt well appearing, in NAD, heart RRR, lungs CTAB, abd NTND, extremities without swelling, strength 5/5 in all extremities and skin without rash.  Midline cervical/thoracic/lumbosacral spine without bony TTP or step off.  Left anterior thigh normal appearing, no swelling/erythema or focal TTP.

## 2022-03-25 NOTE — ED PROVIDER NOTE - CLINICAL SUMMARY MEDICAL DECISION MAKING FREE TEXT BOX
52 yo F with PMH of HTN on amlodipine presenting with leg cramps and back pain x 1 d.  This is concerning for an arrhythmia vs. electrolyte imbalance vs. vasovagal syncope. PT has multiple possible concern etiologies for her symptomatology. Pt will likely have to stay for an echo and monitoring. will order cbc, cmp, troponin, bmp, lytes, ua, uc. 50 yo F with PMH of HTN on amlodipine presenting with leg cramps and back pain x 1 d.  This is concerning for an arrhythmia vs. electrolyte imbalance vs. vasovagal syncope. PT has multiple possible concern etiologies for her symptomatology. P will order cbc, cmp, troponin, bmp, lytes, ua, uc.

## 2022-03-25 NOTE — ED PROVIDER NOTE - PHYSICAL EXAMINATION
General: WN/WD NAD  Head: Atraumatic, normocephalic  Eyes: EOM grossly in tact, no scleral icterus, no discharge  ENT: moist mucous membranes  Neurology: A&Ox 3, nonfocal, PATEL x 4  Respiratory: CTAB, no wheezing, normal respiratory effort  CV: RRR, good s1/s2, no S3, Extremities warm and well perfused  Abdominal: Soft, non-distended, non-tender, no masses  Extremities: No edema, no deformities  Skin: warm and dry. No rashes

## 2022-03-25 NOTE — ED PROVIDER NOTE - CARE PLAN
Adelaida Perez  : 1967  Payor: Zoë Hyatt / Plan: [id number] / Product Type: [employer] /  [address]  at [place] [address] Therapy  [address], [address]  [phone]   [phone]      OUTPATIENT PHYSICAL THERAPY: Daily Treatment Note 3/5/2020  Visit Count:  8  ICD-10: Treatment Diagnosis: right shoulder pain M25.511, Right elbow pain M25.521, Unspecified injury of right shoulder and upper arm, subsequent encounter [S49.91XD]    Pre-treatment Symptoms/Complaints:  Patient reports she has has some slight soreness. Pain: Initial: Pain Intensity 1: 2  Post Session:  2/10   Medications Last Reviewed:  3/5/2020  Updated Objective Findings:  None Today   TREATMENT:   THERAPEUTIC EXERCISE: (29 minutes):  Exercises per grid below to improve mobility. Required minimal verbal cues to promote proper body mechanics. Progressed resistance, range and repetitions as indicated. Biceps curls 5# x 30  Sidelying ER 3# x 30  Sidelying abduction 3# x 30-held  Pulleys x 30  UBE x 7 min-held  Nu-step level 3 x 7 min  Wall washing x 30-held  IR green TB x 25  ER green TB x 25  Rows green TB x 30  Punches red TB x 30-held  Scaption 2# x 25  Standing flexion to 90 2# x 25  Supine flexion 2# x 20  Prone ext 4# x 30  Prone h. abd x 10  Prone h. abd with eccentric lowering x 10  Manual Therapy (   15 min   ): passive range of motion to elbow and shoulder with pt in supine. Light STM to scars and to biceps/shoulder region    Therapeutic Modalities (na     ):na    HEP: As directed. Treatment/Session Summary:    · Response to Treatment:  Patient reporting feeling some shoulder soreness. She still has mild upper trap substitution and required tactile cuing to correct this as she elevated the arm. She is progressing well and gaining strength and mobility.   Last few degrees of her motion is hypomobile and she will continue to benefit from ROM, manual therapy and strengthening. · Communication/Consultation:  None today  · Equipment provided today:  None today  · Recommendations/Intent for next treatment session: Next visit will focus on strengthening and manual therapy.   Treatment Plan of Care Effective Dates:  2/3/2020 to 5/3/2020  Total Treatment Billable Duration:  44 min  PT Patient Time In/Time Out  Time In: 4480  Time Out: Henok Ackerman 56, PT    Future Appointments   Date Time Provider Kalee Montano   3/9/2020 10:00 AM Soto Seen, PT [facility]   3/12/2020  9:45 AM Soto Seen, PT [facility]   3/16/2020  9:45 AM Soto Seen, PT [facility]   3/19/2020  9:45 AM Soto Seen, PT [facility]   3/24/2020  9:45 AM Soto Seen, PT [facility]   3/26/2020  9:45 AM Soto Seen, PT [facility]   3/30/2020  9:45 AM Soto Seen, PT [facility]   4/2/2020  9:45 AM Soto Seen, PT [facility]   4/6/2020  9:45 AM Soto Seen, PT [facility]   4/9/2020 10:00 AM Angelika Kumar [facility]   4/13/2020 10:00 AM Angelika Kumar [facility]   4/16/2020 10:00 AM [facility]   4/20/2020  9:45 AM Soto Seen, PT [facility]   4/23/2020  9:45 AM Soto Seen, PT [facility]   4/27/2020  9:45 AM Soto Seen, PT [facility]   4/30/2020  9:45 AM Soto Seen, PT [facility] Principal Discharge DX:	Leg cramps   1

## 2022-03-25 NOTE — ED ADULT NURSE NOTE - NSIMPLEMENTINTERV_GEN_ALL_ED
Implemented All Fall Risk Interventions:  [place] to call system. Call bell, personal items and telephone within reach. Instruct patient to call for assistance. Room bathroom lighting operational. Non-slip footwear when patient is off stretcher. Physically safe environment: no spills, clutter or unnecessary equipment. Stretcher in lowest position, wheels locked, appropriate side rails in place. Provide visual cue, wrist band, yellow gown, etc. Monitor gait and stability. Monitor for mental status changes and reorient to person, place, and time. Review medications for side effects contributing to fall risk. Reinforce activity limits and safety measures with patient and family.

## 2022-03-25 NOTE — ED ADULT TRIAGE NOTE - CHIEF COMPLAINT QUOTE
Patient brought to ER by EMS from home for c/o body aches and cramping. At around 730 am she had a witnessed syncopal episode by family. .

## 2022-03-25 NOTE — ED PROVIDER NOTE - OBJECTIVE STATEMENT
50 yo F with PMH of HTN on amlodipine presenting with leg cramps and back pain x 1 d. Pt states she is a  for  and had a strenuous training day yesterday. Pt states she didn't eat or drink much. When she arrived home she had leg cramps. Pt states she went to bed, when she woke up she still had the cramps. She also has some lumbar back pain which isn't new but is worse today. Pt took some ibuprofen for her back. PT denies any history of cancer, fever, nausea, vomiting, dysuria, abdominal pain, incontinence.     Collateral from Spouse: According to spouse pt had an episode of syncope this morning. When she woke up she complained of leg pain. As she was getting out of bed she collapsed, her eyes rolled back in her head and she fell towards the ground. Spouse states she didn't hit her head. She did not have any extraneous movements, she was just limp. Pt awoke briefly after but was in and out of conscioussness for 5 minutes. Per spouse, no recent prior episodes of syncope but he mentions remote history of heart problems, unable to further elaborate.

## 2022-03-25 NOTE — ED ADULT NURSE NOTE - OBJECTIVE STATEMENT
Received pt to bed 12, A+Ox4, ambulatory @ baseline. C/O left leg cramping, tender to palpation on the left medial thigh, bilateral pedal pulses palpable.  according to family pt had a witnessed syncopal episode getting out of bed today, denies head injury, positive LOC. NSR on cardiac monitor. Respirations even and unlabored, normal work of breathing, no accessory muscle use, speaking in full clear uninterrupted sentences. ABD is soft, non tender, non distended. Pt denies any chest pain, SOB, headache, dizziness, N/V/D, fever, chills. arrives with 18G to LAC, no redness or swelling noted, Labs sent, Medicated as per MD, will continue to monitor.

## 2022-03-25 NOTE — ED PROVIDER NOTE - PATIENT PORTAL LINK FT
You can access the FollowMyHealth Patient Portal offered by [facility] by registering at the following website: [email or web address]. By joining [facility]’s FollowMyHealth portal, you will also be able to view your health information using other applications (apps) compatible with our system.

## 2022-03-26 LAB
CULTURE RESULTS: SIGNIFICANT CHANGE UP
SPECIMEN SOURCE: SIGNIFICANT CHANGE UP

## 2022-03-28 PROBLEM — I10 ESSENTIAL (PRIMARY) HYPERTENSION: Chronic | Status: ACTIVE | Noted: 2022-03-25

## 2022-03-29 ENCOUNTER — APPOINTMENT (OUTPATIENT)
Dept: INTERNAL MEDICINE | Facility: CLINIC | Age: 52
End: 2022-03-29
Payer: COMMERCIAL

## 2022-03-29 VITALS
BODY MASS INDEX: 39.37 KG/M2 | HEIGHT: 66 IN | SYSTOLIC BLOOD PRESSURE: 140 MMHG | HEART RATE: 91 BPM | DIASTOLIC BLOOD PRESSURE: 80 MMHG | OXYGEN SATURATION: 98 % | WEIGHT: 245 LBS

## 2022-03-29 DIAGNOSIS — R74.8 ABNORMAL LEVELS OF OTHER SERUM ENZYMES: ICD-10-CM

## 2022-03-29 PROCEDURE — 99214 OFFICE O/P EST MOD 30 MIN: CPT

## 2022-03-29 NOTE — PHYSICAL EXAM
[No Acute Distress] : no acute distress [Well Nourished] : well nourished [Well Developed] : well developed [Well-Appearing] : well-appearing [Supple] : supple [No Respiratory Distress] : no respiratory distress  [No Accessory Muscle Use] : no accessory muscle use [Clear to Auscultation] : lungs were clear to auscultation bilaterally [Normal Rate] : normal rate  [Regular Rhythm] : with a regular rhythm [Normal S1, S2] : normal S1 and S2 [No Murmur] : no murmur heard [No Edema] : there was no peripheral edema [No Joint Swelling] : no joint swelling [No Rash] : no rash [Normal Gait] : normal gait [Normal Affect] : the affect was normal

## 2022-03-31 LAB
ANION GAP SERPL CALC-SCNC: 12 MMOL/L
BUN SERPL-MCNC: 18 MG/DL
CALCIUM SERPL-MCNC: 9.3 MG/DL
CHLORIDE SERPL-SCNC: 108 MMOL/L
CK SERPL-CCNC: 124 U/L
CO2 SERPL-SCNC: 25 MMOL/L
CREAT SERPL-MCNC: 0.85 MG/DL
EGFR: 83 ML/MIN/1.73M2
GLUCOSE SERPL-MCNC: 93 MG/DL
MAGNESIUM SERPL-MCNC: 2 MG/DL
POTASSIUM SERPL-SCNC: 4.7 MMOL/L
SODIUM SERPL-SCNC: 144 MMOL/L

## 2022-03-31 NOTE — HISTORY OF PRESENT ILLNESS
[Spouse] : spouse [FreeTextEntry1] : f/up from ER [de-identified] : 52 yo female with h/o as below including HTN here for f/up from ER visit for unresponsive/?syncopal episode.\par Thursday night when left work (had a very rigorous day at work with no breaks or opportunity to eat/drink), when went to come out of the car, had muscle spasm in her left leg and left hand had same muscle spasms/contracture.  Wore off, then went to bed, then woke up at 6:30am, had same muscle spasm in her leg, then tried to stand up (had leg pain when stood up), then per  fell back into bed, eyes rolled back in her head, was unresponsive, tried to speak but minimally moved her mouth and minimally opened her eyes and then closed them again and was again unresponsive, was unresponsive for several minutes,  thought pt had stopped breathing, called EMS,  came and initially thought pt didn't have a pulse but then got pulse back, then rushed to ER.  FSG checked at home was 133, BP by /121.  Pt doesn't recall what happened but was awake when was in ambulance.  Per , slight loss of urine, no fecal incontinence, no tongue biting, no shaking.   In ER, had nl CBC, coags, TSH, cmp, nl magnesium,  elevated, UA negative, Utox negative, covid negative, UCx negative, CT head negative, ECG NSR with incomplete RBBB, prolonged QT but then subsequent ECG nl, given IV fluids and sent home and told to f/up with cardiology.\par Saw cardiologist Dr. Bobby Prabhakar this morning [phone], didn't find anything abnl today, gave orders to have holter, echo, stress test and carotid dopplers done, all pending.\par Since then, feeling slight HA (didn't hit head) and occasional sharp chest pain but no other symptoms, no muscle pains in leg or hand, no spasms or cramping.  Has been drinking fluids.\par When tired, will have to cough to take a deep breath.\par No other active issues, otherwise feeling ok.  Needs note for work.

## 2022-03-31 NOTE — ASSESSMENT
[FreeTextEntry1] : 52 yo female with h/o HTN here for f/up from ER visit after unresponsive/?syncopal episode few days ago.\par 1.  CV - suspect episode from dehydration from poor fluid intake day before due to hectic work schedule, possibly combined with pain from muscle spasm triggering vasovagal response, but will also be ruled out for arrhythmia given prolonged QT on one of ECGs, awaiting further cardiac testing above and cardiology f/up visit, would c/w current amlodipine dose for now and will monitor bp future visit; reinforced regular hydration to prevent subsequent dehydration\par 2.  MSK - fluids as above in case also contributed to mild rhabdomyolysis (and to treat it), f/up CK level, also check bmp and magnesium again\par 3.  Neuro - suggested neuro eval given loss of urine during event in case was atypical seizure\par 4.  Misc - note for work given\par 5.  RTO few months bp check and will need cpe

## 2022-04-12 ENCOUNTER — APPOINTMENT (OUTPATIENT)
Dept: NEUROLOGY | Facility: CLINIC | Age: 52
End: 2022-04-12

## 2022-06-06 ENCOUNTER — APPOINTMENT (OUTPATIENT)
Dept: INTERNAL MEDICINE | Facility: CLINIC | Age: 52
End: 2022-06-06

## 2022-07-26 NOTE — HISTORY OF PRESENT ILLNESS
[FreeTextEntry1] : Called pt, sent paxlovid, advised to take amlodipine every other day and check bp, to call back if worsening symptoms.

## 2022-07-28 ENCOUNTER — NON-APPOINTMENT (OUTPATIENT)
Age: 52
End: 2022-07-28

## 2022-10-31 ENCOUNTER — APPOINTMENT (OUTPATIENT)
Dept: INTERNAL MEDICINE | Facility: CLINIC | Age: 52
End: 2022-10-31

## 2022-10-31 VITALS
BODY MASS INDEX: 40.82 KG/M2 | HEART RATE: 82 BPM | SYSTOLIC BLOOD PRESSURE: 140 MMHG | HEIGHT: 66 IN | WEIGHT: 254 LBS | DIASTOLIC BLOOD PRESSURE: 80 MMHG | OXYGEN SATURATION: 98 %

## 2022-10-31 VITALS — DIASTOLIC BLOOD PRESSURE: 100 MMHG | SYSTOLIC BLOOD PRESSURE: 140 MMHG

## 2022-10-31 DIAGNOSIS — U07.1 COVID-19: ICD-10-CM

## 2022-10-31 DIAGNOSIS — R39.9 UNSPECIFIED SYMPTOMS AND SIGNS INVOLVING THE GENITOURINARY SYSTEM: ICD-10-CM

## 2022-10-31 DIAGNOSIS — Z23 ENCOUNTER FOR IMMUNIZATION: ICD-10-CM

## 2022-10-31 LAB — HCG UR QL: NEGATIVE

## 2022-10-31 PROCEDURE — 81025 URINE PREGNANCY TEST: CPT

## 2022-10-31 PROCEDURE — 90686 IIV4 VACC NO PRSV 0.5 ML IM: CPT

## 2022-10-31 PROCEDURE — 99214 OFFICE O/P EST MOD 30 MIN: CPT | Mod: 25

## 2022-10-31 PROCEDURE — G0008: CPT

## 2022-10-31 RX ORDER — PUMPKIN SEED EXTRACT/SOY GERM 300 MG
CAPSULE ORAL
Refills: 0 | Status: DISCONTINUED | COMMUNITY
End: 2022-10-31

## 2022-10-31 RX ORDER — NIRMATRELVIR AND RITONAVIR 300-100 MG
20 X 150 MG & KIT ORAL
Qty: 5 | Refills: 0 | Status: DISCONTINUED | COMMUNITY
Start: 2022-07-26 | End: 2022-10-31

## 2022-11-01 NOTE — PHYSICAL EXAM
[No Acute Distress] : no acute distress [Well Nourished] : well nourished [Well Developed] : well developed [Well-Appearing] : well-appearing [Supple] : supple [No Respiratory Distress] : no respiratory distress  [No Accessory Muscle Use] : no accessory muscle use [Clear to Auscultation] : lungs were clear to auscultation bilaterally [Normal Rate] : normal rate  [Regular Rhythm] : with a regular rhythm [Normal S1, S2] : normal S1 and S2 [No Murmur] : no murmur heard [No Edema] : there was no peripheral edema [Soft] : abdomen soft [Non Tender] : non-tender [Non-distended] : non-distended [No Masses] : no abdominal mass palpated [No HSM] : no HSM [Normal Bowel Sounds] : normal bowel sounds [No Joint Swelling] : no joint swelling [No Rash] : no rash [Normal Gait] : normal gait [Normal Affect] : the affect was normal

## 2022-11-04 LAB
25(OH)D3 SERPL-MCNC: 18.7 NG/ML
ALBUMIN SERPL ELPH-MCNC: 4 G/DL
ALP BLD-CCNC: 71 U/L
ALT SERPL-CCNC: 19 U/L
ANION GAP SERPL CALC-SCNC: 11 MMOL/L
AST SERPL-CCNC: 17 U/L
BASOPHILS # BLD AUTO: 0.03 K/UL
BASOPHILS NFR BLD AUTO: 0.4 %
BILIRUB SERPL-MCNC: 0.5 MG/DL
BUN SERPL-MCNC: 20 MG/DL
CALCIUM SERPL-MCNC: 9.5 MG/DL
CHLORIDE SERPL-SCNC: 107 MMOL/L
CHOLEST SERPL-MCNC: 188 MG/DL
CO2 SERPL-SCNC: 26 MMOL/L
CREAT SERPL-MCNC: 0.8 MG/DL
EGFR: 89 ML/MIN/1.73M2
EOSINOPHIL # BLD AUTO: 0.08 K/UL
EOSINOPHIL NFR BLD AUTO: 1 %
ESTIMATED AVERAGE GLUCOSE: 123 MG/DL
GLUCOSE SERPL-MCNC: 90 MG/DL
HBA1C MFR BLD HPLC: 5.9 %
HCT VFR BLD CALC: 38.1 %
HDLC SERPL-MCNC: 64 MG/DL
HGB BLD-MCNC: 12.1 G/DL
IMM GRANULOCYTES NFR BLD AUTO: 0.1 %
LDLC SERPL CALC-MCNC: 107 MG/DL
LYMPHOCYTES # BLD AUTO: 2.59 K/UL
LYMPHOCYTES NFR BLD AUTO: 33.7 %
MAN DIFF?: NORMAL
MCHC RBC-ENTMCNC: 28.9 PG
MCHC RBC-ENTMCNC: 31.8 GM/DL
MCV RBC AUTO: 91.1 FL
MONOCYTES # BLD AUTO: 0.62 K/UL
MONOCYTES NFR BLD AUTO: 8.1 %
NEUTROPHILS # BLD AUTO: 4.36 K/UL
NEUTROPHILS NFR BLD AUTO: 56.7 %
NONHDLC SERPL-MCNC: 124 MG/DL
PLATELET # BLD AUTO: 267 K/UL
POTASSIUM SERPL-SCNC: 4.2 MMOL/L
PROT SERPL-MCNC: 7.2 G/DL
RBC # BLD: 4.18 M/UL
RBC # FLD: 14.6 %
SODIUM SERPL-SCNC: 144 MMOL/L
TRIGL SERPL-MCNC: 85 MG/DL
TSH SERPL-ACNC: 0.73 UIU/ML
WBC # FLD AUTO: 7.69 K/UL

## 2022-11-04 NOTE — ASSESSMENT
[FreeTextEntry1] : 53 yo female with h/o as above including HTN and obesity here for f/up visit.\par 1.  CV - bp elevated here and elevated on last visit as well on norvasc 5 mg, does not want to increase norvasc (also concern for leg swelling which she had previously) so will try hctz 12.5 mg day and f/up 1 month bp check and bmp; to work on diet/exercise, can check bp at home; check lipids and A1c\par 2.  GI - nausea/bloating - urine pregnancy test negative, advised trial of pepcid and overdue to see GI for colonoscopy and would also suggest EGD as well; check lfts\par 3.  Gyn - due for pap/pelvic exam so to f/up with gyn; mammo utd\par 4.  HCM - check below labs as not done recently; flu shot today\par 5.  RTO 1 month bp check\par \par \par

## 2022-11-04 NOTE — HISTORY OF PRESENT ILLNESS
[FreeTextEntry1] : bp check [de-identified] : 51 yo female with h/o as below including HTN here for f/up and bp check.\par Having bad pain from right buttock to leg, doesn't want to take any medication for it.  Difficult to go for PT.\par Last menses 9/3/22, nausea every time eating and bloated.  Will have BMs in the morning, not constipated.  No vomiting.  No GERD. \par Feet not as swollen as before. \par Doesn't check bp at home.\par Eating more, eating lunch (will eat leftovers from the prior night).  Breakfast will be 2 eggs, turkey zaman and crackers, lunch leftovers, dinner is fish, Indonesian fruit, will have rice once/twice per week, mac and cheese a little.  Doesn't snack, does eat apples/bananas, no sweets, isabelle tea or [place] tea.  Seasonings have salt but doesn't use natural salt.   Doesn't exercise but walks all day at work.\par Not checking bp at home.  No HA, slight dizzy in the morning if getting up too fast, does have some muscle cramps.  Does drink a lot of water.  No chest pain, no shortness of breath.  \par No other active issues.

## 2022-11-04 NOTE — ADDENDUM
[FreeTextEntry1] : 11/4/22:  Reviewed labs, nl except , A1c 5.9 c/w preDM to do diet/exercise for both, vitamin D 18 c/w insufficiency to take supplement, other labs nl - mailed to pt.

## 2022-11-14 ENCOUNTER — APPOINTMENT (OUTPATIENT)
Dept: GASTROENTEROLOGY | Facility: CLINIC | Age: 52
End: 2022-11-14
Payer: COMMERCIAL

## 2022-11-14 VITALS
DIASTOLIC BLOOD PRESSURE: 60 MMHG | HEART RATE: 87 BPM | SYSTOLIC BLOOD PRESSURE: 118 MMHG | HEIGHT: 66 IN | WEIGHT: 248 LBS | OXYGEN SATURATION: 98 % | BODY MASS INDEX: 39.86 KG/M2 | TEMPERATURE: 97.4 F

## 2022-11-14 DIAGNOSIS — K21.9 GASTRO-ESOPHAGEAL REFLUX DISEASE W/OUT ESOPHAGITIS: ICD-10-CM

## 2022-11-14 PROCEDURE — 99203 OFFICE O/P NEW LOW 30 MIN: CPT

## 2022-11-14 NOTE — PHYSICAL EXAM

## 2022-11-14 NOTE — ASSESSMENT
[FreeTextEntry1] : This is a 52-year-old female with history of chronic GERD for over 3 months duration.  I explained to her the meaning of GERD.  I recommend eating small meals and avoid laying down after eating.  I recommend avoiding foods that can trigger reflux including spicy foods and caffeinated beverages.  I recommend an upper endoscopy.  I recommend a screening colonoscopy.  I explained to her the risks, alternatives and benefits of both procedures.  Risk including but not limited to bleeding, perforation, infection and adverse medication reaction.  Questions were answered.  She stated understanding.

## 2022-11-14 NOTE — HISTORY OF PRESENT ILLNESS
[FreeTextEntry1] : This is a pleasant 52-year-old female with history hypertension referred by Dr. Roberta Ramachandran for consultation for colon cancer screening.  She has never had a lower GI evaluation.  She reports for the past few months having symptoms of heartburn and food regurgitation.  She denies dysphagia odynophagia.  Symptoms are worse on a spicy diet.  She denies abdominal pain, nausea or vomiting.  She denies changes in bowel habits.  She denies rectal bleeding or melena.  She denies weight loss or anemia.  She denies family history of colon cancer or colon polyps.

## 2022-12-05 ENCOUNTER — APPOINTMENT (OUTPATIENT)
Dept: INTERNAL MEDICINE | Facility: CLINIC | Age: 52
End: 2022-12-05

## 2022-12-05 VITALS
OXYGEN SATURATION: 98 % | BODY MASS INDEX: 40.66 KG/M2 | WEIGHT: 253 LBS | DIASTOLIC BLOOD PRESSURE: 80 MMHG | SYSTOLIC BLOOD PRESSURE: 110 MMHG | HEART RATE: 83 BPM | HEIGHT: 66 IN

## 2022-12-05 DIAGNOSIS — Z87.898 PERSONAL HISTORY OF OTHER SPECIFIED CONDITIONS: ICD-10-CM

## 2022-12-05 PROCEDURE — 99213 OFFICE O/P EST LOW 20 MIN: CPT

## 2022-12-06 LAB
ANION GAP SERPL CALC-SCNC: 9 MMOL/L
BUN SERPL-MCNC: 14 MG/DL
CALCIUM SERPL-MCNC: 9.6 MG/DL
CHLORIDE SERPL-SCNC: 101 MMOL/L
CO2 SERPL-SCNC: 27 MMOL/L
CREAT SERPL-MCNC: 0.72 MG/DL
EGFR: 101 ML/MIN/1.73M2
GLUCOSE SERPL-MCNC: 85 MG/DL
POTASSIUM SERPL-SCNC: 4 MMOL/L
SODIUM SERPL-SCNC: 137 MMOL/L

## 2022-12-06 NOTE — ASSESSMENT
[FreeTextEntry1] : 51 yo female with h/o as above including HTN here for f/up and bp check on HCTZ, bp at goal, check bmp, c/w current regimen.  Lower back strain, suspect muscle strain/spasm, advised regular exercise/activity, rx PT given, can try topical therapies.  RTO few months cpe as due.

## 2022-12-06 NOTE — PHYSICAL EXAM
[No Acute Distress] : no acute distress [Well Nourished] : well nourished [Well Developed] : well developed [Well-Appearing] : well-appearing [Supple] : supple [No Respiratory Distress] : no respiratory distress  [No Accessory Muscle Use] : no accessory muscle use [Clear to Auscultation] : lungs were clear to auscultation bilaterally [Normal Rate] : normal rate  [Regular Rhythm] : with a regular rhythm [Normal S1, S2] : normal S1 and S2 [No Murmur] : no murmur heard [No Edema] : there was no peripheral edema [No Spinal Tenderness] : no spinal tenderness [No Joint Swelling] : no joint swelling [No Rash] : no rash [Normal Gait] : normal gait [Normal Affect] : the affect was normal [de-identified] : noting pain lower back in sacroiliac level, +pain left lower back on flexion right hip when standing, no pain left lower back when flexion of left hip

## 2022-12-06 NOTE — HISTORY OF PRESENT ILLNESS
[FreeTextEntry1] : bp check [de-identified] : 53 yo female with h/o as below including HTN here for f/up and bp check after addition of HCTZ last visit.\par Has pain right lower back when getting up/when changing positions, believes muscle pain, believes due to being active at work, going on for a while.  Pain 7/10 in severity.  Pain improved when standing, worse when sitting and gets up to stand.  Taking tylenol only, takes tylenol arthritis.  Pain only occurs with change in position.\par Still hasn't had subsequent menses, LMP 9/3/22.  No further nausea, drinking water.  Awaiting EGD and colonoscopy.\par Taking HCTZ daily, no side effects, no HA, no dizziness/lightheadedness, no vision changes, no chest pain, no shortness of breath.  Checks bp at home, bp 120/84 yesterday, highest bp 134-140, on average bp 120/80.\par No other active issues.

## 2022-12-13 ENCOUNTER — APPOINTMENT (OUTPATIENT)
Dept: GASTROENTEROLOGY | Facility: AMBULATORY MEDICAL SERVICES | Age: 52
End: 2022-12-13
Payer: COMMERCIAL

## 2022-12-13 PROCEDURE — 45385 COLONOSCOPY W/LESION REMOVAL: CPT | Mod: 33

## 2022-12-13 PROCEDURE — 45380 COLONOSCOPY AND BIOPSY: CPT | Mod: 33,59

## 2022-12-13 PROCEDURE — 43239 EGD BIOPSY SINGLE/MULTIPLE: CPT

## 2023-01-03 DIAGNOSIS — M54.9 DORSALGIA, UNSPECIFIED: ICD-10-CM

## 2023-01-17 LAB — H PYLORI AG STL QL: NEGATIVE

## 2023-01-23 ENCOUNTER — NON-APPOINTMENT (OUTPATIENT)
Age: 53
End: 2023-01-23

## 2023-02-15 NOTE — PHYSICAL EXAM
[de-identified] : WDWN in NAD\par HEENT:  unremarkable\par Neck:  supple, no JVD, no LN\par Lungs:  CTA B/L, no W/R/R\par Heart:  Reg rate, +S1S2, no M/R/G\par Abdomen:  soft, NT, ND, +BS, no masses, no HS-megaly\par Genital: No pubic or genital lesions noted.\par Ext:  no C/C/E\par Neuro:  no focal deficits

## 2023-02-15 NOTE — HISTORY OF PRESENT ILLNESS
[FreeTextEntry1] : f/up [de-identified] : RAINE MONTERO  is a 52 year old female  with history of  HTN, cardiomyopathy, chronic GERD, morbid obesity, preDM, PTSD, syncope and collapse presented today for f/up  ED visit.\par \par \par

## 2023-02-17 ENCOUNTER — APPOINTMENT (OUTPATIENT)
Dept: INTERNAL MEDICINE | Facility: CLINIC | Age: 53
End: 2023-02-17

## 2023-02-27 ENCOUNTER — NON-APPOINTMENT (OUTPATIENT)
Age: 53
End: 2023-02-27

## 2023-02-27 ENCOUNTER — APPOINTMENT (OUTPATIENT)
Dept: INTERNAL MEDICINE | Facility: CLINIC | Age: 53
End: 2023-02-27
Payer: COMMERCIAL

## 2023-02-27 VITALS
DIASTOLIC BLOOD PRESSURE: 70 MMHG | SYSTOLIC BLOOD PRESSURE: 120 MMHG | OXYGEN SATURATION: 99 % | WEIGHT: 265 LBS | HEART RATE: 109 BPM | BODY MASS INDEX: 42.59 KG/M2 | HEIGHT: 66 IN

## 2023-02-27 DIAGNOSIS — R00.2 PALPITATIONS: ICD-10-CM

## 2023-02-27 PROCEDURE — 99214 OFFICE O/P EST MOD 30 MIN: CPT | Mod: 25

## 2023-02-27 PROCEDURE — 93000 ELECTROCARDIOGRAM COMPLETE: CPT

## 2023-02-27 RX ORDER — BISMUTH SUBSALICYLATE 262 MG/1
262 TABLET, CHEWABLE ORAL 4 TIMES DAILY
Qty: 40 | Refills: 0 | Status: DISCONTINUED | COMMUNITY
Start: 2022-12-20 | End: 2023-02-27

## 2023-02-27 RX ORDER — TETRACYCLINE HYDROCHLORIDE 250 MG/1
250 CAPSULE ORAL 4 TIMES DAILY
Qty: 40 | Refills: 0 | Status: DISCONTINUED | COMMUNITY
Start: 2022-12-20 | End: 2023-02-27

## 2023-02-27 RX ORDER — CYCLOBENZAPRINE HYDROCHLORIDE 5 MG/1
5 TABLET, FILM COATED ORAL 3 TIMES DAILY
Qty: 42 | Refills: 0 | Status: DISCONTINUED | COMMUNITY
Start: 2023-01-03 | End: 2023-02-27

## 2023-02-27 RX ORDER — OMEPRAZOLE 20 MG/1
20 CAPSULE, DELAYED RELEASE ORAL DAILY
Qty: 30 | Refills: 11 | Status: DISCONTINUED | COMMUNITY
Start: 2022-12-13 | End: 2023-02-27

## 2023-02-27 RX ORDER — SODIUM PICOSULFATE, MAGNESIUM OXIDE, AND ANHYDROUS CITRIC ACID 10; 3.5; 12 MG/160ML; G/160ML; G/160ML
10-3.5-12 MG-GM LIQUID ORAL
Qty: 1 | Refills: 0 | Status: DISCONTINUED | COMMUNITY
Start: 2022-11-14 | End: 2023-02-27

## 2023-02-27 RX ORDER — METRONIDAZOLE 250 MG/1
250 TABLET ORAL EVERY 6 HOURS
Qty: 40 | Refills: 0 | Status: DISCONTINUED | COMMUNITY
Start: 2022-12-20 | End: 2023-02-27

## 2023-03-07 ENCOUNTER — TRANSCRIPTION ENCOUNTER (OUTPATIENT)
Age: 53
End: 2023-03-07

## 2023-03-07 LAB
ANION GAP SERPL CALC-SCNC: 12 MMOL/L
BUN SERPL-MCNC: 24 MG/DL
CALCIUM SERPL-MCNC: 9.6 MG/DL
CHLORIDE SERPL-SCNC: 103 MMOL/L
CO2 SERPL-SCNC: 26 MMOL/L
CREAT SERPL-MCNC: 0.92 MG/DL
EGFR: 75 ML/MIN/1.73M2
ESTIMATED AVERAGE GLUCOSE: 114 MG/DL
GLUCOSE SERPL-MCNC: 107 MG/DL
HBA1C MFR BLD HPLC: 5.6 %
MAGNESIUM SERPL-MCNC: 1.8 MG/DL
POTASSIUM SERPL-SCNC: 4.6 MMOL/L
SODIUM SERPL-SCNC: 140 MMOL/L
TSH SERPL-ACNC: 1.05 UIU/ML

## 2023-03-07 NOTE — PHYSICAL EXAM
[No Acute Distress] : no acute distress [Well Nourished] : well nourished [Well Developed] : well developed [Well-Appearing] : well-appearing [Supple] : supple [No Respiratory Distress] : no respiratory distress  [No Accessory Muscle Use] : no accessory muscle use [Clear to Auscultation] : lungs were clear to auscultation bilaterally [Normal Rate] : normal rate  [Regular Rhythm] : with a regular rhythm [Normal S1, S2] : normal S1 and S2 [No Murmur] : no murmur heard [No Carotid Bruits] : no carotid bruits [Pedal Pulses Present] : the pedal pulses are present [No Joint Swelling] : no joint swelling [No Rash] : no rash [Normal Gait] : normal gait [Normal Affect] : the affect was normal [de-identified] : trace-1+ pitting edema RLE, trace pitting edema LLE

## 2023-03-07 NOTE — REVIEW OF SYSTEMS
[Palpitations] : palpitations [Fainting] : fainting [Negative] : Heme/Lymph [FreeTextEntry9] : see hpi [de-identified] : see hpi

## 2023-03-07 NOTE — HISTORY OF PRESENT ILLNESS
[FreeTextEntry8] : 51 yo female with h/o as below here for recent syncopal episode.\josé miguel Was on vacation in [place], morning of 2/9/23, got up and went to bathroom, prior to walking to the bathroom had bad muscle spasm in leg, tried to stand up to get rid of muscle spasm but still present with leg pain and then had to urinate, so walked to bathroom, when was sitting on toilet then felt cold, like water was poured over body, fell, called out to family but everything went dark/syncopized.  Family asked her questions which she answered correctly, sister checked blood pressure and said was low.  Family called EMS, vision was blurry and couldn't move legs, taken to ER, had nl CT angio chest (no PE) but difficult study, had nl cbc/cmp, sent home.  \par Menstrual period started 2/3/23 until 2/13 so not sure if was due to blood loss from menses that contributed to syncopal episode.\josé miguel Had 2 prior syncopal episodes in life, in 2010 occurred and had echo and told "heart was larger than nl" but nothing else found, then last year in 2022 had syncopal episode again in the morning also associated with leg spasm, then afterwards last year had full cardiac w/up including event monitor for a week and nothing found.  \par Noting that HR has been reading high on apple watch and feeling palpitations intermittently but without associated dizziness.\josé miguel Does drink a lot of water, hasn't had any alcohol since the fainting event but previously drank socially. \par Getting muscle spasms different areas of the body, sometimes in calf, sometimes in hand (like hand will suddenly spasm and will take a while to move a finger again), sometimes abdomen, sometimes neck, previously would occur sporadically but now much more often. \par On quadruple therapy for H pylori recently and then had negative test of cure afterwards.\josé miguel Also has been off of work due to injury (workman's comp), due to a fall, and going back to that doctor to get cleared to go back to work.\par No other active issues.\par

## 2023-03-07 NOTE — ASSESSMENT
[FreeTextEntry1] : 51 yo female with h/o as above including HTN, obesity, preDM here for recent syncopal episode.\par 1.  CV - syncopal episode of ?cause, ?vasovagal in setting of pain + urination + possible volume depletion from menses, vs. arrhythmia (with possibly electrolyte abnl contributing), K 3.5 low nl in ER, would repeat bmp and magnesium levels and suggested to consider K supplementation in diet given on hctz; ECG today NSR without abnl but advised to f/up with cardiology given reported high HR and palpitations as may need another event monitor; check TSH as well for palpitations\par 2.  MSK - check lytes for muscle cramps, if lytes nl and no improvement with possible supplementation may need to consider trial off diuretic in case contributing\par 3.  Endo - mildly elevated glucose in ER, check A1c again\par 4.  RTO 1 month cpe

## 2023-03-13 ENCOUNTER — APPOINTMENT (OUTPATIENT)
Dept: INTERNAL MEDICINE | Facility: CLINIC | Age: 53
End: 2023-03-13

## 2023-03-13 DIAGNOSIS — Z12.11 ENCOUNTER FOR SCREENING FOR MALIGNANT NEOPLASM OF COLON: ICD-10-CM

## 2023-03-13 NOTE — HEALTH RISK ASSESSMENT
[Patient reported mammogram was normal] : Patient reported mammogram was normal [Patient reported colonoscopy was normal] : Patient reported colonoscopy was normal [MammogramDate] : 03/22 [PapSmearComments] : due now [ColonoscopyDate] : 12/22 [ColonoscopyComments] : 5 year f/up due

## 2023-03-13 NOTE — PAST MEDICAL HISTORY
[Menstruating] : menstruating [Excessive Bleeding] : there was excessive bleeding [Regular Cycle Intervals] : have been regular [Total Preg ___] : G[unfilled] [Full Term ___] : Full Term: [unfilled] [AB Spont ___] : miscarriages: [unfilled]

## 2023-03-29 ENCOUNTER — APPOINTMENT (OUTPATIENT)
Dept: NEUROLOGY | Facility: CLINIC | Age: 53
End: 2023-03-29
Payer: COMMERCIAL

## 2023-03-29 VITALS
HEIGHT: 66 IN | SYSTOLIC BLOOD PRESSURE: 122 MMHG | TEMPERATURE: 98.3 F | WEIGHT: 264 LBS | HEART RATE: 87 BPM | OXYGEN SATURATION: 96 % | DIASTOLIC BLOOD PRESSURE: 78 MMHG | BODY MASS INDEX: 42.43 KG/M2

## 2023-03-29 DIAGNOSIS — Z86.79 PERSONAL HISTORY OF OTHER DISEASES OF THE CIRCULATORY SYSTEM: ICD-10-CM

## 2023-03-29 DIAGNOSIS — R51.9 HEADACHE, UNSPECIFIED: ICD-10-CM

## 2023-03-29 PROCEDURE — 99205 OFFICE O/P NEW HI 60 MIN: CPT

## 2023-03-29 RX ORDER — ACETAMINOPHEN 325 MG/1
325 TABLET ORAL EVERY 6 HOURS
Qty: 120 | Refills: 0 | Status: DISCONTINUED | COMMUNITY
Start: 2023-03-29 | End: 2023-03-29

## 2023-03-30 ENCOUNTER — APPOINTMENT (OUTPATIENT)
Dept: OBGYN | Facility: CLINIC | Age: 53
End: 2023-03-30
Payer: COMMERCIAL

## 2023-03-30 ENCOUNTER — NON-APPOINTMENT (OUTPATIENT)
Age: 53
End: 2023-03-30

## 2023-03-30 VITALS
SYSTOLIC BLOOD PRESSURE: 134 MMHG | HEIGHT: 66 IN | BODY MASS INDEX: 42.75 KG/M2 | WEIGHT: 266 LBS | DIASTOLIC BLOOD PRESSURE: 88 MMHG

## 2023-03-30 DIAGNOSIS — N94.6 DYSMENORRHEA, UNSPECIFIED: ICD-10-CM

## 2023-03-30 DIAGNOSIS — Z01.419 ENCOUNTER FOR GYNECOLOGICAL EXAMINATION (GENERAL) (ROUTINE) W/OUT ABNORMAL FINDINGS: ICD-10-CM

## 2023-03-30 PROCEDURE — 99386 PREV VISIT NEW AGE 40-64: CPT

## 2023-03-30 NOTE — HISTORY OF PRESENT ILLNESS
[Regular Cycle Intervals] : periods have been regular [Currently Active] : currently active [Mammogramdate] : 3/22 [PapSmeardate] : >3YRS AGO [ColonoscopyDate] : 12/22 [PGHxTotal] : 4 [[facility]] : 4 [[facility] Center[facility]] : 3

## 2023-04-01 LAB
C TRACH RRNA SPEC QL NAA+PROBE: NOT DETECTED
HIV1+2 AB SPEC QL IA.RAPID: NONREACTIVE
HPV HIGH+LOW RISK DNA PNL CVX: NOT DETECTED
N GONORRHOEA RRNA SPEC QL NAA+PROBE: NOT DETECTED
SOURCE AMPLIFICATION: NORMAL
T PALLIDUM AB SER QL IA: NEGATIVE

## 2023-04-03 ENCOUNTER — APPOINTMENT (OUTPATIENT)
Dept: NEUROLOGY | Facility: CLINIC | Age: 53
End: 2023-04-03
Payer: COMMERCIAL

## 2023-04-03 PROCEDURE — 95816 EEG AWAKE AND DROWSY: CPT

## 2023-04-19 ENCOUNTER — APPOINTMENT (OUTPATIENT)
Dept: OBGYN | Facility: CLINIC | Age: 53
End: 2023-04-19
Payer: COMMERCIAL

## 2023-04-19 VITALS
SYSTOLIC BLOOD PRESSURE: 120 MMHG | WEIGHT: 273 LBS | BODY MASS INDEX: 43.87 KG/M2 | DIASTOLIC BLOOD PRESSURE: 78 MMHG | HEIGHT: 66 IN

## 2023-04-19 PROCEDURE — 58100 BIOPSY OF UTERUS LINING: CPT

## 2023-04-19 NOTE — PROCEDURE
[Endometrial Biopsy] : Endometrial biopsy [Time out performed] : Pre-procedure time out performed.  Patient's name, date of birth and procedure confirmed. [Consent Obtained] : Consent obtained [Irregular Bleeding] : irregular uterine bleeding [Risks] : risks [Benefits] : benefits [Alternatives] : alternatives [Patient] : patient [Infection] : infection [Bleeding] : bleeding [Allergic Reaction] : allergic reaction [Uterine Perforation] : uterine perforation [Pain] : pain [Betadine] : Betadine [None] : none [Tenaculum] : Tenaculum [Easy Passage] : Easy passage [Sounded to ___ cm] : sounded to [unfilled] ~Ucm [Anteverted] : anteverted [Scant] : scant [Sent to Pathology] : placed in buffered formalin and sent for pathology [Tolerated Well] : Patient tolerated the procedure well [No Complications] : No complications

## 2023-04-20 ENCOUNTER — OUTPATIENT (OUTPATIENT)
Dept: OUTPATIENT SERVICES | Facility: HOSPITAL | Age: 53
LOS: 1 days | End: 2023-04-20
Payer: COMMERCIAL

## 2023-04-20 ENCOUNTER — APPOINTMENT (OUTPATIENT)
Dept: MRI IMAGING | Facility: CLINIC | Age: 53
End: 2023-04-20
Payer: COMMERCIAL

## 2023-04-20 DIAGNOSIS — R55 SYNCOPE AND COLLAPSE: ICD-10-CM

## 2023-04-20 PROCEDURE — 76377 3D RENDER W/INTRP POSTPROCES: CPT | Mod: 26

## 2023-04-20 PROCEDURE — 76377 3D RENDER W/INTRP POSTPROCES: CPT

## 2023-04-20 PROCEDURE — 70553 MRI BRAIN STEM W/O & W/DYE: CPT

## 2023-04-20 PROCEDURE — 70553 MRI BRAIN STEM W/O & W/DYE: CPT | Mod: 26

## 2023-04-21 ENCOUNTER — OUTPATIENT (OUTPATIENT)
Dept: OUTPATIENT SERVICES | Facility: HOSPITAL | Age: 53
LOS: 1 days | End: 2023-04-21
Payer: COMMERCIAL

## 2023-04-21 ENCOUNTER — APPOINTMENT (OUTPATIENT)
Dept: ULTRASOUND IMAGING | Facility: IMAGING CENTER | Age: 53
End: 2023-04-21
Payer: COMMERCIAL

## 2023-04-21 ENCOUNTER — APPOINTMENT (OUTPATIENT)
Dept: MAMMOGRAPHY | Facility: IMAGING CENTER | Age: 53
End: 2023-04-21
Payer: COMMERCIAL

## 2023-04-21 ENCOUNTER — RESULT REVIEW (OUTPATIENT)
Age: 53
End: 2023-04-21

## 2023-04-21 DIAGNOSIS — N92.0 EXCESSIVE AND FREQUENT MENSTRUATION WITH REGULAR CYCLE: ICD-10-CM

## 2023-04-21 PROCEDURE — 77067 SCR MAMMO BI INCL CAD: CPT | Mod: 26

## 2023-04-21 PROCEDURE — 76856 US EXAM PELVIC COMPLETE: CPT

## 2023-04-21 PROCEDURE — 76856 US EXAM PELVIC COMPLETE: CPT | Mod: 26

## 2023-04-21 PROCEDURE — 77063 BREAST TOMOSYNTHESIS BI: CPT | Mod: 26

## 2023-04-21 PROCEDURE — 76830 TRANSVAGINAL US NON-OB: CPT

## 2023-04-21 PROCEDURE — 76830 TRANSVAGINAL US NON-OB: CPT | Mod: 26

## 2023-04-21 PROCEDURE — 77067 SCR MAMMO BI INCL CAD: CPT

## 2023-04-21 PROCEDURE — 77063 BREAST TOMOSYNTHESIS BI: CPT

## 2023-04-27 LAB — CORE LAB BIOPSY: NORMAL

## 2023-04-30 PROBLEM — R51.9 PAIN IN THE HEAD: Status: ACTIVE | Noted: 2023-03-29

## 2023-04-30 NOTE — DATA REVIEWED
[de-identified] : MRI Right Hip (3/3/23):\par - Mild bone marrow edema posterior superior to greater trochanter and in ischial tuberosity, suggestive of resolving contusion\par - Low-grade partial tear of hamstring origin\par - Mild gluteus medius insertional tendinosis and mild abductor peritendinitis\par - Incidental right adnexal cyst

## 2023-04-30 NOTE — HISTORY OF PRESENT ILLNESS
[FreeTextEntry1] : This is a 52-year-old right-handed woman who has had two episodes of loss of consciousness over the past year, once on 3/25/22 in [place] and then on 2/9/23 while traveling in [place]. During the first episode, she had woken up around 7:00 am and then when sitting up, she appeared still with hands forming fists and then head leaning back, lasting up to 8 minutes. On 2/9/23, also in the morning soon after waking up, she was walking to the bathroom and then only remembers being on the floor. Her  observed that she fell and then appeared still on the ground for a few minutes. Neither of these episodes involved tongue or cheek bite, nor urinary or fecal incontinence. Since the second episode, she has been experiencing heaviness and pain at the right posterior head almost every day, without identifiable aggravating factors, and relieved by over-the-counter Acetaminophen. Headaches are associated with nausea and blurry vision, but not with vomiting or dizziness, although she can experience dizziness at other times when she stands up suddenly. The patient has been experiencing intermittent muscle spasms, especially in her calves and feet, since 2021. These spasms can occur at any time of day, and seem to be aggravated when she is working for a long time. SEVERE

## 2023-04-30 NOTE — REASON FOR VISIT
[Post Hospitalization] : a post hospitalization visit [Other: _____] : [unfilled] [FreeTextEntry1] : Syncope vs. Seizure

## 2023-04-30 NOTE — PHYSICAL EXAM
[General Appearance - Alert] : alert [General Appearance - In No Acute Distress] : in no acute distress [Impaired Insight] : insight and judgment were intact [Oriented To Time, Place, And Person] : oriented to person, place, and time [Affect] : the affect was normal [Person] : oriented to person [Place] : oriented to place [Time] : oriented to time [Concentration Intact] : normal concentrating ability [Visual Intact] : visual attention was ~T not ~L decreased [Fluency] : fluency intact [Comprehension] : comprehension intact [Cranial Nerves Optic (II)] : visual acuity intact bilaterally,  visual fields full to confrontation, pupils equal round and reactive to light [Cranial Nerves Oculomotor (III)] : extraocular motion intact [Cranial Nerves Facial (VII)] : face symmetrical [Cranial Nerves Trigeminal (V)] : facial sensation intact symmetrically [Cranial Nerves Vestibulocochlear (VIII)] : hearing was intact bilaterally [Cranial Nerves Glossopharyngeal (IX)] : tongue and palate midline [Cranial Nerves Accessory (XI - Cranial And Spinal)] : head turning and shoulder shrug symmetric [Cranial Nerves Hypoglossal (XII)] : there was no tongue deviation with protrusion [Motor Strength] : muscle strength was normal in all four extremities [No Muscle Atrophy] : normal bulk in all four extremities [Motor Handedness Right-Handed] : the patient is right hand dominant [Paresis Pronator Drift Right-Sided] : no pronator drift on the right [Paresis Pronator Drift Left-Sided] : no pronator drift on the left [Motor Strength Upper Extremities Bilaterally] : strength was normal in both upper extremities [Motor Strength Lower Extremities Bilaterally] : strength was normal in both lower extremities [Sensation Tactile Decrease] : light touch was intact [Balance] : balance was intact [Past-pointing] : there was no past-pointing [Tremor] : no tremor present [Coordination - Dysmetria Impaired Finger-to-Nose Bilateral] : not present [Coordination - Dysmetria Impaired Heel-to-Shin Bilateral] : not present [2+] : Ankle jerk left 2+ [Plantar Reflex Right Only] : normal on the right [Plantar Reflex Left Only] : normal on the left [___] : absent on the right [___] : absent on the left [FreeTextEntry8] : Normal, narrow-based gait. No difficulty with tiptoe, heel, and tandem gaits. [Sclera] : the sclera and conjunctiva were normal [PERRL With Normal Accommodation] : pupils were equal in size, round, reactive to light, with normal accommodation [Outer Ear] : the ears and nose were normal in appearance [Extraocular Movements] : extraocular movements were intact [Oropharynx] : the oropharynx was normal [Neck Appearance] : the appearance of the neck was normal [Auscultation Breath Sounds / Voice Sounds] : lungs were clear to auscultation bilaterally [Heart Rate And Rhythm] : heart rate was normal and rhythm regular [Heart Sounds] : normal S1 and S2 [Arterial Pulses Carotid] : carotid pulses were normal with no bruits [Full Pulse] : the pedal pulses are present [Abdomen Soft] : soft [Abdomen Tenderness] : non-tender [No CVA Tenderness] : no ~M costovertebral angle tenderness [No Spinal Tenderness] : no spinal tenderness [Abnormal Walk] : normal gait [Nail Clubbing] : no clubbing  or cyanosis of the fingernails [Musculoskeletal - Swelling] : no joint swelling seen [Motor Tone] : muscle strength and tone were normal [Skin Color & Pigmentation] : normal skin color and pigmentation [Skin Turgor] : normal skin turgor [] : no rash

## 2023-05-08 ENCOUNTER — RX RENEWAL (OUTPATIENT)
Age: 53
End: 2023-05-08

## 2023-05-18 ENCOUNTER — APPOINTMENT (OUTPATIENT)
Dept: ULTRASOUND IMAGING | Facility: CLINIC | Age: 53
End: 2023-05-18
Payer: COMMERCIAL

## 2023-05-18 PROCEDURE — 76831 ECHO EXAM UTERUS: CPT

## 2023-05-18 PROCEDURE — 58340 CATHETER FOR HYSTEROGRAPHY: CPT

## 2023-05-25 ENCOUNTER — APPOINTMENT (OUTPATIENT)
Dept: OBGYN | Facility: CLINIC | Age: 53
End: 2023-05-25
Payer: COMMERCIAL

## 2023-05-25 DIAGNOSIS — N92.0 EXCESSIVE AND FREQUENT MENSTRUATION WITH REGULAR CYCLE: ICD-10-CM

## 2023-05-25 DIAGNOSIS — N94.89 OTHER SPECIFIED CONDITIONS ASSOCIATED WITH FEMALE GENITAL ORGANS AND MENSTRUAL CYCLE: ICD-10-CM

## 2023-05-25 PROCEDURE — 99212 OFFICE O/P EST SF 10 MIN: CPT | Mod: 95

## 2023-06-10 ENCOUNTER — OUTPATIENT (OUTPATIENT)
Dept: OUTPATIENT SERVICES | Facility: HOSPITAL | Age: 53
LOS: 1 days | End: 2023-06-10
Payer: COMMERCIAL

## 2023-06-10 ENCOUNTER — APPOINTMENT (OUTPATIENT)
Dept: MRI IMAGING | Facility: CLINIC | Age: 53
End: 2023-06-10
Payer: COMMERCIAL

## 2023-06-10 DIAGNOSIS — N92.0 EXCESSIVE AND FREQUENT MENSTRUATION WITH REGULAR CYCLE: ICD-10-CM

## 2023-06-10 DIAGNOSIS — N94.89 OTHER SPECIFIED CONDITIONS ASSOCIATED WITH FEMALE GENITAL ORGANS AND MENSTRUAL CYCLE: ICD-10-CM

## 2023-06-10 DIAGNOSIS — Z00.8 ENCOUNTER FOR OTHER GENERAL EXAMINATION: ICD-10-CM

## 2023-06-10 PROCEDURE — 72197 MRI PELVIS W/O & W/DYE: CPT

## 2023-06-10 PROCEDURE — 72197 MRI PELVIS W/O & W/DYE: CPT | Mod: 26

## 2023-06-10 PROCEDURE — A9585: CPT

## 2023-06-15 ENCOUNTER — NON-APPOINTMENT (OUTPATIENT)
Age: 53
End: 2023-06-15

## 2023-06-22 ENCOUNTER — APPOINTMENT (OUTPATIENT)
Dept: NEUROLOGY | Facility: CLINIC | Age: 53
End: 2023-06-22
Payer: COMMERCIAL

## 2023-06-22 VITALS
HEIGHT: 66 IN | WEIGHT: 270 LBS | HEART RATE: 79 BPM | SYSTOLIC BLOOD PRESSURE: 117 MMHG | DIASTOLIC BLOOD PRESSURE: 79 MMHG | BODY MASS INDEX: 43.39 KG/M2

## 2023-06-22 VITALS — SYSTOLIC BLOOD PRESSURE: 143 MMHG | DIASTOLIC BLOOD PRESSURE: 96 MMHG | HEART RATE: 81 BPM

## 2023-06-22 DIAGNOSIS — R41.3 OTHER AMNESIA: ICD-10-CM

## 2023-06-22 DIAGNOSIS — G31.9 DEGENERATIVE DISEASE OF NERVOUS SYSTEM, UNSPECIFIED: ICD-10-CM

## 2023-06-22 DIAGNOSIS — R55 SYNCOPE AND COLLAPSE: ICD-10-CM

## 2023-06-22 PROCEDURE — 99417 PROLNG OP E/M EACH 15 MIN: CPT

## 2023-06-22 PROCEDURE — G2212 PROLONG OUTPT/OFFICE VIS: CPT

## 2023-06-22 PROCEDURE — 99215 OFFICE O/P EST HI 40 MIN: CPT

## 2023-06-22 RX ORDER — ELECTROLYTES/DEXTROSE
SOLUTION, ORAL ORAL DAILY
Qty: 30 | Refills: 0 | Status: DISCONTINUED | COMMUNITY
Start: 2023-03-29 | End: 2023-06-22

## 2023-06-22 NOTE — HISTORY OF PRESENT ILLNESS
[FreeTextEntry1] : FROM 3/29/23:\par This is a 52-year-old right-handed woman who has had two episodes of loss of consciousness over the past year, once on 3/25/22 in [place] and then on 2/9/23 while traveling in [place]. During the first episode, she had woken up around 7:00 am and then when sitting up, she appeared still with hands forming fists and then head leaning back, lasting up to 8 minutes. On 2/9/23, also in the morning soon after waking up, she was walking to the bathroom and then only remembers being on the floor. Her  observed that she fell and then appeared still on the ground for a few minutes. Neither of these episodes involved tongue or cheek bite, nor urinary or fecal incontinence. Since the second episode, she has been experiencing heaviness and pain at the right posterior head almost every day, without identifiable aggravating factors, and relieved by over-the-counter Acetaminophen. Headaches are associated with nausea and blurry vision, but not with vomiting or dizziness, although she can experience dizziness at other times when she stands up suddenly. The patient has been experiencing intermittent muscle spasms, especially in her calves and feet, since 2021. These spasms can occur at any time of day, and seem to be aggravated when she is working for a long time. \par \par FROM 6/22/23:\par Since I last saw the patient on 3/29/23, she had one episode of dizziness and cold sweats while at home after finishing her night shift, without fall or loss of consciousness, for which she had to sit down and then lie down for 15-20 minutes. After these symptoms resolved, she still felt weak, and then went to sleep - she called in sick for work the following night. She had consumed an energy drink at the start of the shift before these symptoms occurred, and she wonders if this could have caused her symptoms. She has not had any recent headaches, nausea, vomiting, or vision changes. She has been following with cardiologist, Dr. Bobby Prabhakar, and has undergone an Echocardiogram and Cardiac stress test, both of which were reported to be normal. She is pending results of her 72-hour Holter monitor study.

## 2023-06-22 NOTE — PHYSICAL EXAM
[General Appearance - Alert] : alert [General Appearance - In No Acute Distress] : in no acute distress [Oriented To Time, Place, And Person] : oriented to person, place, and time [Impaired Insight] : insight and judgment were intact [Affect] : the affect was normal [Person] : oriented to person [Place] : oriented to place [Time] : oriented to time [Concentration Intact] : normal concentrating ability [Visual Intact] : visual attention was ~T not ~L decreased [Fluency] : fluency intact [Comprehension] : comprehension intact [Cranial Nerves Optic (II)] : visual acuity intact bilaterally,  visual fields full to confrontation, pupils equal round and reactive to light [Cranial Nerves Oculomotor (III)] : extraocular motion intact [Cranial Nerves Trigeminal (V)] : facial sensation intact symmetrically [Cranial Nerves Facial (VII)] : face symmetrical [Cranial Nerves Vestibulocochlear (VIII)] : hearing was intact bilaterally [Cranial Nerves Glossopharyngeal (IX)] : tongue and palate midline [Cranial Nerves Accessory (XI - Cranial And Spinal)] : head turning and shoulder shrug symmetric [Cranial Nerves Hypoglossal (XII)] : there was no tongue deviation with protrusion [Motor Strength] : muscle strength was normal in all four extremities [No Muscle Atrophy] : normal bulk in all four extremities [Motor Handedness Right-Handed] : the patient is right hand dominant [Balance] : balance was intact [2+] : Biceps left 2+ [Sclera] : the sclera and conjunctiva were normal [PERRL With Normal Accommodation] : pupils were equal in size, round, reactive to light, with normal accommodation [Extraocular Movements] : extraocular movements were intact [Outer Ear] : the ears and nose were normal in appearance [Oropharynx] : the oropharynx was normal [Neck Appearance] : the appearance of the neck was normal [Auscultation Breath Sounds / Voice Sounds] : lungs were clear to auscultation bilaterally [Heart Rate And Rhythm] : heart rate was normal and rhythm regular [Heart Sounds] : normal S1 and S2 [Arterial Pulses Carotid] : carotid pulses were normal with no bruits [Full Pulse] : the pedal pulses are present [Abdomen Soft] : soft [Abdomen Tenderness] : non-tender [No CVA Tenderness] : no ~M costovertebral angle tenderness [No Spinal Tenderness] : no spinal tenderness [Abnormal Walk] : normal gait [Nail Clubbing] : no clubbing  or cyanosis of the fingernails [Musculoskeletal - Swelling] : no joint swelling seen [Motor Tone] : muscle strength and tone were normal [Skin Color & Pigmentation] : normal skin color and pigmentation [Skin Turgor] : normal skin turgor [] : no rash [Sensation Tactile Decrease] : light touch was intact [Sensation Pain / Temperature Decrease] : pain and temperature was intact [1+] : Ankle jerk left 1+ [FreeTextEntry1] : Neurocognitive Examination Functionality Questionnaire\par \par Relationship: Self\par Frequency of interactions in the past month: Daily / Lives with\par \par Jeffrey Index of [place] in Activities of Daily Living:\par 1. Bathing/Showerin\par 2. Dressin\par 3. Toiletin\par 4. Transferrin\par 5. Continence: 1\par 6: Feedin\par \par TOTAL: 6\par \par \par Rebecca-Naseem Instrumental Activities of Daily Living:\par A. Ability to Use Telephone: 1\par B. Shoppin\par C. Food Preparation: 1\par D. Housekeepin\par E. Laundry: 1\par F. Transportation: 1\par G. Responsibility for Own Medications: 1\par H: Ability to Handle Finances: 1\par \par TOTAL: 8 [Paresis Pronator Drift Right-Sided] : no pronator drift on the right [Paresis Pronator Drift Left-Sided] : no pronator drift on the left [Motor Strength Upper Extremities Bilaterally] : strength was normal in both upper extremities [Motor Strength Lower Extremities Bilaterally] : strength was normal in both lower extremities [Past-pointing] : there was no past-pointing [Tremor] : no tremor present [Dysdiadochokinesia Bilaterally] : not present [Coordination - Dysmetria Impaired Finger-to-Nose Bilateral] : not present [Coordination - Dysmetria Impaired Heel-to-Shin Bilateral] : not present [Plantar Reflex Right Only] : normal on the right [Plantar Reflex Left Only] : normal on the left [___] : absent on the right [___] : absent on the left [FreeTextEntry4] : Immediate recall: 3/3. 5-minute delayed recall: 3/3. [FreeTextEntry8] : Normal, narrow-based gait. No difficulty with tiptoe, heel, and tandem gaits.

## 2023-06-22 NOTE — ASSESSMENT
[FreeTextEntry1] : 52 RHF with likely syncopal event, without neurodiagnostic evidence of epilepsy, also with memory loss in the setting of diffuse cerebral atrophy

## 2023-06-22 NOTE — DATA REVIEWED
[de-identified] : 3D MRI Brain w/wo Gadolinium (4/20/23):\par - No acute intracranial abnormality or abnormal enhancement\par - Intracranial problem at 39th percentile for patient's age and gender [de-identified] : Routine EEG (4/3/23): Normal awake, drowsy, and asleep study [de-identified] : MRI Right Hip (3/3/23):\par - Mild bone marrow edema posterior superior to greater trochanter and in ischial tuberosity, suggestive of resolving contusion\par - Low-grade partial tear of hamstring origin\par - Mild gluteus medius insertional tendinosis and mild abductor peritendinitis\par - Incidental right adnexal cyst

## 2023-07-21 ENCOUNTER — APPOINTMENT (OUTPATIENT)
Dept: OBGYN | Facility: CLINIC | Age: 53
End: 2023-07-21
Payer: COMMERCIAL

## 2023-07-21 VITALS
HEIGHT: 66 IN | BODY MASS INDEX: 43.39 KG/M2 | DIASTOLIC BLOOD PRESSURE: 81 MMHG | SYSTOLIC BLOOD PRESSURE: 121 MMHG | WEIGHT: 270 LBS

## 2023-07-21 PROCEDURE — 99214 OFFICE O/P EST MOD 30 MIN: CPT

## 2023-07-21 NOTE — HISTORY OF PRESENT ILLNESS
[FreeTextEntry1] : 54 y/o Pt here for CON for AUB and ovarian cysts. States that she has heavy periods regularly and desires definitive treatment. Pt states has tried medical management in the past with little success. Currently on Aygestin daily which has stopped her period but when she stops the medication bleeding resumes. Desires defiinitve treatment. asking for a hysterectomy. \par . Pt has heavy bleeding and painful cramping. 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

## 2023-07-21 NOTE — PHYSICAL EXAM
[Labia Majora] : normal [Labia Minora] : normal [Normal] : normal [Uterine Adnexae] : normal [Appropriately responsive] : appropriately responsive [Alert] : alert [No Acute Distress] : no acute distress [No Lymphadenopathy] : no lymphadenopathy [Regular Rate Rhythm] : regular rate rhythm [No Murmurs] : no murmurs [Clear to Auscultation B/L] : clear to auscultation bilaterally [Soft] : soft [Non-tender] : non-tender [Non-distended] : non-distended [No HSM] : No HSM [No Lesions] : no lesions [No Mass] : no mass [Oriented x3] : oriented x3 [FreeTextEntry7] : limited  [Enlarged ___ wks] : enlarged [unfilled] ~Uweeks [FreeTextEntry6] : limited exam

## 2023-07-21 NOTE — PLAN
[FreeTextEntry1] : 54 y/o Pt here for CON for AUB, desires definitve management\par agyestin refilled\par Discussed management of AUB at length, including medical, surgical options, UAE discussed. Patient desires definitive surgery. Discussed risks of TLHBS, cystoscopy. Reviewed potential of scar tissue need for supracervical hysterectomy, understands potential for cyclical bleeding and continual pap smears if cervix left in place. Reviewed risks  of surgery including but not limited to VTE, SSI, damage to bowel, bladder, thermal damage,  need for laparotomy, blood transfusion. Reviewed post operative care. All questions answered at length\par needs PCP clearance\par \par Sarah Yu MD \par

## 2023-07-25 ENCOUNTER — NON-APPOINTMENT (OUTPATIENT)
Age: 53
End: 2023-07-25

## 2023-08-24 ENCOUNTER — OUTPATIENT (OUTPATIENT)
Dept: OUTPATIENT SERVICES | Facility: HOSPITAL | Age: 53
LOS: 1 days | End: 2023-08-24
Payer: COMMERCIAL

## 2023-08-24 VITALS
DIASTOLIC BLOOD PRESSURE: 67 MMHG | OXYGEN SATURATION: 95 % | WEIGHT: 259.93 LBS | HEART RATE: 85 BPM | HEIGHT: 66 IN | RESPIRATION RATE: 20 BRPM | SYSTOLIC BLOOD PRESSURE: 94 MMHG | TEMPERATURE: 98 F

## 2023-08-24 DIAGNOSIS — N93.9 ABNORMAL UTERINE AND VAGINAL BLEEDING, UNSPECIFIED: ICD-10-CM

## 2023-08-24 DIAGNOSIS — E66.01 MORBID (SEVERE) OBESITY DUE TO EXCESS CALORIES: ICD-10-CM

## 2023-08-24 DIAGNOSIS — Z01.818 ENCOUNTER FOR OTHER PREPROCEDURAL EXAMINATION: ICD-10-CM

## 2023-08-24 DIAGNOSIS — Z98.891 HISTORY OF UTERINE SCAR FROM PREVIOUS SURGERY: Chronic | ICD-10-CM

## 2023-08-24 DIAGNOSIS — Z29.9 ENCOUNTER FOR PROPHYLACTIC MEASURES, UNSPECIFIED: ICD-10-CM

## 2023-08-24 DIAGNOSIS — D25.9 LEIOMYOMA OF UTERUS, UNSPECIFIED: Chronic | ICD-10-CM

## 2023-08-24 LAB
A1C WITH ESTIMATED AVERAGE GLUCOSE RESULT: 6.3 % — HIGH (ref 4–5.6)
ANION GAP SERPL CALC-SCNC: 15 MMOL/L — SIGNIFICANT CHANGE UP (ref 5–17)
BLD GP AB SCN SERPL QL: NEGATIVE — SIGNIFICANT CHANGE UP
BUN SERPL-MCNC: 22 MG/DL — SIGNIFICANT CHANGE UP (ref 7–23)
CALCIUM SERPL-MCNC: 9.5 MG/DL — SIGNIFICANT CHANGE UP (ref 8.4–10.5)
CHLORIDE SERPL-SCNC: 103 MMOL/L — SIGNIFICANT CHANGE UP (ref 96–108)
CO2 SERPL-SCNC: 21 MMOL/L — LOW (ref 22–31)
CREAT SERPL-MCNC: 1.04 MG/DL — SIGNIFICANT CHANGE UP (ref 0.5–1.3)
EGFR: 65 ML/MIN/1.73M2 — SIGNIFICANT CHANGE UP
ESTIMATED AVERAGE GLUCOSE: 134 MG/DL — HIGH (ref 68–114)
GLUCOSE SERPL-MCNC: 80 MG/DL — SIGNIFICANT CHANGE UP (ref 70–99)
HCT VFR BLD CALC: 37.6 % — SIGNIFICANT CHANGE UP (ref 34.5–45)
HGB BLD-MCNC: 11.9 G/DL — SIGNIFICANT CHANGE UP (ref 11.5–15.5)
MCHC RBC-ENTMCNC: 27.3 PG — SIGNIFICANT CHANGE UP (ref 27–34)
MCHC RBC-ENTMCNC: 31.6 GM/DL — LOW (ref 32–36)
MCV RBC AUTO: 86.2 FL — SIGNIFICANT CHANGE UP (ref 80–100)
NRBC # BLD: 0 /100 WBCS — SIGNIFICANT CHANGE UP (ref 0–0)
PLATELET # BLD AUTO: 342 K/UL — SIGNIFICANT CHANGE UP (ref 150–400)
POTASSIUM SERPL-MCNC: 3.8 MMOL/L — SIGNIFICANT CHANGE UP (ref 3.5–5.3)
POTASSIUM SERPL-SCNC: 3.8 MMOL/L — SIGNIFICANT CHANGE UP (ref 3.5–5.3)
RBC # BLD: 4.36 M/UL — SIGNIFICANT CHANGE UP (ref 3.8–5.2)
RBC # FLD: 14.8 % — HIGH (ref 10.3–14.5)
RH IG SCN BLD-IMP: POSITIVE — SIGNIFICANT CHANGE UP
SODIUM SERPL-SCNC: 139 MMOL/L — SIGNIFICANT CHANGE UP (ref 135–145)
WBC # BLD: 7.68 K/UL — SIGNIFICANT CHANGE UP (ref 3.8–10.5)
WBC # FLD AUTO: 7.68 K/UL — SIGNIFICANT CHANGE UP (ref 3.8–10.5)

## 2023-08-24 PROCEDURE — 86901 BLOOD TYPING SEROLOGIC RH(D): CPT

## 2023-08-24 PROCEDURE — 87086 URINE CULTURE/COLONY COUNT: CPT

## 2023-08-24 PROCEDURE — 86850 RBC ANTIBODY SCREEN: CPT

## 2023-08-24 PROCEDURE — 83036 HEMOGLOBIN GLYCOSYLATED A1C: CPT

## 2023-08-24 PROCEDURE — G0463: CPT

## 2023-08-24 PROCEDURE — 80048 BASIC METABOLIC PNL TOTAL CA: CPT

## 2023-08-24 PROCEDURE — 86900 BLOOD TYPING SEROLOGIC ABO: CPT

## 2023-08-24 PROCEDURE — 85027 COMPLETE CBC AUTOMATED: CPT

## 2023-08-24 RX ORDER — CEFOTETAN DISODIUM 1 G
2 VIAL (EA) INJECTION ONCE
Refills: 0 | Status: DISCONTINUED | OUTPATIENT
Start: 2023-09-11 | End: 2023-09-25

## 2023-08-24 NOTE — H&P PST ADULT - ATTENDING COMMENTS
Attending Note    Pt with AUB-L, here for definitive management. Discussed plan for TLHBS cystoscopy, possible ovarian cystectomy, oophorectomy, discussed risks of surgery including but not limited to VTE, SSI, damage to bowel, bladder, ureter, thermal damage, need for laparotomy, transfusion. Reviewed post operative recovery. Understands learners are part of her case and performing EUA. All questions answered    Sarah Yu MD

## 2023-08-24 NOTE — H&P PST ADULT - PROBLEM SELECTOR PLAN 3
ERP, laparoscopic total hysterectomy, laparoscopic right salpingectomy/oophorectomy, laparoscopic left salpingectomy, cystoscopy  ERP instructions given  labs drawn

## 2023-08-24 NOTE — H&P PST ADULT - HISTORY OF PRESENT ILLNESS
54 yo female with pmhx of alopecia, vasovagal syncope x2 (seen by neuro and cardio this year),   HTN, uterine fibroids, c/o heavy menstrual bleeding found to have ovarian cysts, s/p uterine fibroids removal vaginally about 2 yrs ago. 52 yo female with pmhx of alopecia, vasovagal syncope x2 (seen by neuro and cardio this year),   HTN, uterine fibroids, c/o heavy menstrual bleeding found to have ovarian cysts, s/p uterine fibroids removal vaginally about 2 yrs ago. Now scheduled for Total hysterectomy on 9/11/23.  pt currently on norethindrone to control bleeding.

## 2023-08-24 NOTE — H&P PST ADULT - PROBLEM SELECTOR PROBLEM 1
CC: Prenatal visit    Sandra Botello is a 26 y.o.  at 17w0d.  Doing well.  Denies contractions, LOF, or VB.  Reports good FM.    Constipation-takes suppositories prn  Had COVID. Low grade temps, headaches. No significant SOA. Overall feeling well with resolved symptoms.    /78   Wt 85.5 kg (188 lb 9.6 oz)   LMP 2022   BMI 31.38 kg/m²   SVE: deferred     Fetal Heart Rate: 155    A/P: Sandra Botello is a 26 y.o.  at 17w0d.  - RTC in 3 weeks for anatomy  - Reviewed COVID-19 visitation policy  - Reviewed COVID-19 precautions    Problem List Items Addressed This Visit        Gastrointestinal Abdominal     Chronic constipation    Overview     Chronic constipation, internal hemorrhoids prior to pregnancy  Continue stool softener prn, may need to add Miralax              Genitourinary and Reproductive     Urinary tract infection in female       Gravid and     Supervision of other normal pregnancy, antepartum - Primary    Overview     H/o spontaneous sab x1, early  Rh+  RI  IOB Hgb 13.1, Plt 206  Desires cfDNA, ordered for after 10 week  HSV-needs suppression 35-36 weeks              Other    History of herpes genitalis    Overview     Plan for suppression at 35-36 weeks           COVID-19 virus detected    Overview     Status post COVID in early 2nd trimester  Consider BPPs, serial growth US in pregnancy                    Diagnosis Plan   1. Supervision of other normal pregnancy, antepartum     2. History of herpes genitalis     3. Chronic constipation     4. Urinary tract infection in female     5. COVID-19 virus detected       Edith Olivo DO  2022  08:43 CDT    
Obesity, morbid, BMI 40.0-49.9

## 2023-08-24 NOTE — H&P PST ADULT - NSICDXPASTMEDICALHX_GEN_ALL_CORE_FT
PAST MEDICAL HISTORY:  Fibroid uterus     HTN (hypertension)     Vasovagal syncope      PAST MEDICAL HISTORY:  Alopecia     Fibroid uterus     HTN (hypertension)     Vasovagal syncope      PAST MEDICAL HISTORY:  Alopecia     Fibroid uterus     HTN (hypertension)     Obesity, morbid, BMI 40.0-49.9     Vasovagal syncope

## 2023-08-24 NOTE — H&P PST ADULT - ASSESSMENT
DASI score: 6  DASI activity: [place], [place]  Loose teeth or denture:  denies loose teeth DASI score: 6  DASI activity: walks [place], housework  Loose teeth or denture:  denies loose teeth  CAPRINI VTE 2.0 SCORE [CLOT updated 2019]    AGE RELATED RISK FACTORS                                                       MOBILITY RELATED FACTORS  [ ] Age 41-60 years                                            (1 Point)                    [ ] Bed rest                                                        (1 Point)  [ ] Age: 61-74 years                                           (2 Points)                  [ ] Plaster cast                                                   (2 Points)  [ ] Age= 75 years                                              (3 Points)                    [ ] Bed bound for more than 72 hours                 (2 Points)    DISEASE RELATED RISK FACTORS                                               GENDER SPECIFIC FACTORS  [ ] Edema in the lower extremities                       (1 Point)              [ ] Pregnancy                                                     (1 Point)  [ ] Varicose veins                                               (1 Point)                     [ ] Post-partum < 6 weeks                                   (1 Point)             [ ] BMI > 25 Kg/m2                                            (1 Point)                     [ ] Hormonal therapy  or oral contraception          (1 Point)                 [ ] Sepsis (in the previous month)                        (1 Point)               [ ] History of pregnancy complications                 (1 point)  [ ] Pneumonia or serious lung disease                                               [ ] Unexplained or recurrent                     (1 Point)           (in the previous month)                               (1 Point)  [ ] Abnormal pulmonary function test                     (1 Point)                 SURGERY RELATED RISK FACTORS  [ ] Acute myocardial infarction                              (1 Point)               [ ]  Section                                             (1 Point)  [ ] Congestive heart failure (in the previous month)  (1 Point)      [ ] Minor surgery                                                  (1 Point)   [ ] Inflammatory bowel disease                             (1 Point)               [ ] Arthroscopic surgery                                        (2 Points)  [ ] Central venous access                                      (2 Points)                [ ] General surgery lasting more than 45 minutes (2 points)  [ ] Malignancy- Present or previous                   (2 Points)                [ ] Elective arthroplasty                                         (5 points)    [ ] Stroke (in the previous month)                          (5 Points)                                                                                                                                                           HEMATOLOGY RELATED FACTORS                                                 TRAUMA RELATED RISK FACTORS  [ ] Prior episodes of VTE                                     (3 Points)                [ ] Fracture of the hip, pelvis, or leg                       (5 Points)  [ ] Positive family history for VTE                         (3 Points)             [ ] Acute spinal cord injury (in the previous month)  (5 Points)  [ ] Prothrombin 22556 A                                     (3 Points)               [ ] Paralysis  (less than 1 month)                             (5 Points)  [ ] Factor V Leiden                                             (3 Points)                  [ ] Multiple Trauma within 1 month                        (5 Points)  [ ] Lupus anticoagulants                                     (3 Points)                                                           [ ] Anticardiolipin antibodies                               (3 Points)                                                       [ ] High homocysteine in the blood                      (3 Points)                                             [ ] Other congenital or acquired thrombophilia      (3 Points)                                                [ ] Heparin induced thrombocytopenia                  (3 Points)                                     Total Score [          ] DASI score: 6  DASI activity: [place], housework  Loose teeth or denture:  denies loose teeth  CAPRINI VTE 2.0 SCORE [CLOT updated 2019]    AGE RELATED RISK FACTORS                                                       MOBILITY RELATED FACTORS  [x ] Age 41-60 years                                            (1 Point)                    [ ] Bed rest                                                        (1 Point)  [ ] Age: 61-74 years                                           (2 Points)                  [ ] Plaster cast                                                   (2 Points)  [ ] Age= 75 years                                              (3 Points)                    [ ] Bed bound for more than 72 hours                 (2 Points)    DISEASE RELATED RISK FACTORS                                               GENDER SPECIFIC FACTORS  [ ] Edema in the lower extremities                       (1 Point)              [ ] Pregnancy                                                     (1 Point)  [ ] Varicose veins                                               (1 Point)                     [ ] Post-partum < 6 weeks                                   (1 Point)             [x ] BMI > 25 Kg/m2                                            (1 Point)                     [ ] Hormonal therapy  or oral contraception          (1 Point)                 [ ] Sepsis (in the previous month)                        (1 Point)               [ ] History of pregnancy complications                 (1 point)  [ ] Pneumonia or serious lung disease                                               [ ] Unexplained or recurrent                     (1 Point)           (in the previous month)                               (1 Point)  [ ] Abnormal pulmonary function test                     (1 Point)                 SURGERY RELATED RISK FACTORS  [ ] Acute myocardial infarction                              (1 Point)               [ ]  Section                                             (1 Point)  [ ] Congestive heart failure (in the previous month)  (1 Point)      [ ] Minor surgery                                                  (1 Point)   [ ] Inflammatory bowel disease                             (1 Point)               [ ] Arthroscopic surgery                                        (2 Points)  [ ] Central venous access                                      (2 Points)                [x ] General surgery lasting more than 45 minutes (2 points)  [ ] Malignancy- Present or previous                   (2 Points)                [ ] Elective arthroplasty                                         (5 points)    [ ] Stroke (in the previous month)                          (5 Points)                                                                                                                                                           HEMATOLOGY RELATED FACTORS                                                 TRAUMA RELATED RISK FACTORS  [ ] Prior episodes of VTE                                     (3 Points)                [ ] Fracture of the hip, pelvis, or leg                       (5 Points)  [ ] Positive family history for VTE                         (3 Points)             [ ] Acute spinal cord injury (in the previous month)  (5 Points)  [ ] Prothrombin 72689 A                                     (3 Points)               [ ] Paralysis  (less than 1 month)                             (5 Points)  [ ] Factor V Leiden                                             (3 Points)                  [ ] Multiple Trauma within 1 month                        (5 Points)  [ ] Lupus anticoagulants                                     (3 Points)                                                           [ ] Anticardiolipin antibodies                               (3 Points)                                                       [ ] High homocysteine in the blood                      (3 Points)                                             [ ] Other congenital or acquired thrombophilia      (3 Points)                                                [ ] Heparin induced thrombocytopenia                  (3 Points)                                     Total Score [   4       ]

## 2023-08-25 ENCOUNTER — NON-APPOINTMENT (OUTPATIENT)
Age: 53
End: 2023-08-25

## 2023-08-25 ENCOUNTER — APPOINTMENT (OUTPATIENT)
Dept: INTERNAL MEDICINE | Facility: CLINIC | Age: 53
End: 2023-08-25
Payer: COMMERCIAL

## 2023-08-25 ENCOUNTER — OUTPATIENT (OUTPATIENT)
Dept: OUTPATIENT SERVICES | Facility: HOSPITAL | Age: 53
LOS: 1 days | End: 2023-08-25
Payer: COMMERCIAL

## 2023-08-25 VITALS
BODY MASS INDEX: 42.11 KG/M2 | HEIGHT: 66 IN | SYSTOLIC BLOOD PRESSURE: 110 MMHG | WEIGHT: 262 LBS | OXYGEN SATURATION: 98 % | HEART RATE: 86 BPM | DIASTOLIC BLOOD PRESSURE: 80 MMHG

## 2023-08-25 DIAGNOSIS — D25.9 LEIOMYOMA OF UTERUS, UNSPECIFIED: Chronic | ICD-10-CM

## 2023-08-25 DIAGNOSIS — N83.209 UNSPECIFIED OVARIAN CYST, UNSPECIFIED SIDE: ICD-10-CM

## 2023-08-25 DIAGNOSIS — Z98.891 HISTORY OF UTERINE SCAR FROM PREVIOUS SURGERY: Chronic | ICD-10-CM

## 2023-08-25 DIAGNOSIS — I42.9 CARDIOMYOPATHY, UNSPECIFIED: ICD-10-CM

## 2023-08-25 DIAGNOSIS — I10 ESSENTIAL (PRIMARY) HYPERTENSION: ICD-10-CM

## 2023-08-25 PROBLEM — E66.01 MORBID (SEVERE) OBESITY DUE TO EXCESS CALORIES: Chronic | Status: ACTIVE | Noted: 2023-08-24

## 2023-08-25 PROBLEM — L65.9 NONSCARRING HAIR LOSS, UNSPECIFIED: Chronic | Status: ACTIVE | Noted: 2023-08-24

## 2023-08-25 PROBLEM — R55 SYNCOPE AND COLLAPSE: Chronic | Status: ACTIVE | Noted: 2023-08-24

## 2023-08-25 PROCEDURE — G0463: CPT

## 2023-08-25 PROCEDURE — 99214 OFFICE O/P EST MOD 30 MIN: CPT | Mod: 25

## 2023-08-25 RX ORDER — DICLOFENAC POTASSIUM 25 MG/1
25 TABLET, FILM COATED ORAL DAILY
Qty: 30 | Refills: 0 | Status: DISCONTINUED | COMMUNITY
Start: 2023-06-22 | End: 2023-08-25

## 2023-08-25 RX ORDER — CYCLOBENZAPRINE HYDROCHLORIDE 5 MG/1
5 TABLET, FILM COATED ORAL DAILY
Qty: 30 | Refills: 0 | Status: DISCONTINUED | COMMUNITY
Start: 2023-06-22 | End: 2023-08-25

## 2023-08-25 NOTE — REVIEW OF SYSTEMS
[FreeTextEntry2] : Constitutional:  no fever and no chills.  Eyes:  no discharge.  HEENT:  no earache.  Cardiovascular:  no chest pain, no palpitations and no lower extremity edema.  Respiratory:  no shortness of breath, no wheezing and no cough.  Gastrointestinal:  no abdominal pain, no nausea and no vomiting.  Genitourinary:  no dysuria.  Musculoskeletal:  no joint pain.  Integumentary:  no itching.  Neurological:  no headache.  Psychiatric:  not suicidal.  Hematologic/Lymphatic:  no easy bleeding. [FreeTextEntry8] : see hpi

## 2023-08-25 NOTE — PHYSICAL EXAM
[Soft] : abdomen soft [Normal Bowel Sounds] : normal bowel sounds [No CVA Tenderness] : no CVA  tenderness [de-identified] : WDWN in NAD HEENT:  unremarkable Neck:  supple, no JVD, no LN Lungs:  CTA B/L, no W/R/R Heart:  Reg rate, +S1S2, no M/R/G Abdomen:  soft, NT, ND, +BS, no masses, no HS-megaly Genital: No pubic or genital lesions noted. Ext:  no C/C/E Neuro:  no focal deficits [de-identified] : obesely distended, lower pelvic mass.

## 2023-08-25 NOTE — ASSESSMENT
[High Risk Surgery - Intraperitoneal, Intrathoracic or Supringuinal Vascular Procedures] : High Risk Surgery - Intraperitoneal, Intrathoracic or Supringuinal Vascular Procedures - No (0) [Ischemic Heart Disease] : Ischemic Heart Disease - No (0) [Congestive Heart Failure] : Congestive Heart Failure - No (0) [Prior Cerebrovascular Accident or TIA] : Prior Cerebrovascular Accident or TIA - No (0) [Creatinine >= 2mg/dL (1 Point)] : Creatinine >= 2mg/dL - No (0) [Insulin-dependent Diabetic (1 Point)] : Insulin-dependent Diabetic - No (0) [0] : 0 , RCRI Class: I, Risk of Post-Op Cardiac Complications: 3.9%, 95% CI for Risk Estimate: 2.8% - 5.4% [Patient Optimized for Surgery] : Patient optimized for surgery [No Further Testing Recommended] : no further testing recommended [Continue medications as is] : Continue current medications [As per surgery] : as per surgery [FreeTextEntry4] : Ms. RAINE MONTERO is a 53 year old Black or  Yudy  female  with history of   morbid obesity, HTN, preDM, GERD presented today for preop medical clearance. Pt is low risk candidate for moderate risk procedure with no further w/up required prior to planned surgery and no contraindication to proceeding with planned surgery.    [FreeTextEntry7] : Do not take Aspirin, NSAID( Motrin, Ibuprofen etc.), Herb, supplement 7 days prior to surgery. Take amlodipine with small sips of water in the morning of surgery. Hold HCTZ on surgery day.

## 2023-08-25 NOTE — RESULTS/DATA
[de-identified] : Reviewed LAB 8/24/23: CBC, BMP normal . A1C 6.3 Reviewed ECG 8/25 /23 : results reviewed, Sinus Rhythm 78bpm, -Prominent R(V1) and left axis -nonspecific -Seen with pulmonary disease -possible anterior fascicular block. no acute ST/T changes, no LVH.

## 2023-08-26 LAB
CULTURE RESULTS: SIGNIFICANT CHANGE UP
SPECIMEN SOURCE: SIGNIFICANT CHANGE UP

## 2023-08-27 ENCOUNTER — RX RENEWAL (OUTPATIENT)
Age: 53
End: 2023-08-27

## 2023-09-10 ENCOUNTER — TRANSCRIPTION ENCOUNTER (OUTPATIENT)
Age: 53
End: 2023-09-10

## 2023-09-11 ENCOUNTER — TRANSCRIPTION ENCOUNTER (OUTPATIENT)
Age: 53
End: 2023-09-11

## 2023-09-11 ENCOUNTER — OUTPATIENT (OUTPATIENT)
Dept: INPATIENT UNIT | Facility: HOSPITAL | Age: 53
LOS: 1 days | End: 2023-09-11
Payer: COMMERCIAL

## 2023-09-11 ENCOUNTER — APPOINTMENT (OUTPATIENT)
Dept: OBGYN | Facility: HOSPITAL | Age: 53
End: 2023-09-11

## 2023-09-11 ENCOUNTER — RESULT REVIEW (OUTPATIENT)
Age: 53
End: 2023-09-11

## 2023-09-11 VITALS
HEIGHT: 66 IN | TEMPERATURE: 99 F | DIASTOLIC BLOOD PRESSURE: 68 MMHG | RESPIRATION RATE: 16 BRPM | SYSTOLIC BLOOD PRESSURE: 100 MMHG | HEART RATE: 74 BPM | WEIGHT: 259.93 LBS

## 2023-09-11 VITALS
RESPIRATION RATE: 17 BRPM | SYSTOLIC BLOOD PRESSURE: 127 MMHG | HEART RATE: 88 BPM | OXYGEN SATURATION: 99 % | DIASTOLIC BLOOD PRESSURE: 79 MMHG

## 2023-09-11 DIAGNOSIS — Z98.891 HISTORY OF UTERINE SCAR FROM PREVIOUS SURGERY: Chronic | ICD-10-CM

## 2023-09-11 DIAGNOSIS — N93.9 ABNORMAL UTERINE AND VAGINAL BLEEDING, UNSPECIFIED: ICD-10-CM

## 2023-09-11 DIAGNOSIS — D25.9 LEIOMYOMA OF UTERUS, UNSPECIFIED: Chronic | ICD-10-CM

## 2023-09-11 LAB
GLUCOSE BLDC GLUCOMTR-MCNC: 79 MG/DL — SIGNIFICANT CHANGE UP (ref 70–99)
HCG UR QL: NEGATIVE — SIGNIFICANT CHANGE UP

## 2023-09-11 PROCEDURE — C9399: CPT

## 2023-09-11 PROCEDURE — 88302 TISSUE EXAM BY PATHOLOGIST: CPT | Mod: 26

## 2023-09-11 PROCEDURE — 88307 TISSUE EXAM BY PATHOLOGIST: CPT | Mod: 26

## 2023-09-11 PROCEDURE — C1765: CPT

## 2023-09-11 PROCEDURE — C1889: CPT

## 2023-09-11 PROCEDURE — 88302 TISSUE EXAM BY PATHOLOGIST: CPT

## 2023-09-11 PROCEDURE — 58570 TLH UTERUS 250 G OR LESS: CPT

## 2023-09-11 PROCEDURE — 81025 URINE PREGNANCY TEST: CPT

## 2023-09-11 PROCEDURE — 82962 GLUCOSE BLOOD TEST: CPT

## 2023-09-11 PROCEDURE — 58571 TLH W/T/O 250 G OR LESS: CPT

## 2023-09-11 PROCEDURE — 88307 TISSUE EXAM BY PATHOLOGIST: CPT

## 2023-09-11 DEVICE — VISTASEAL FIBRIN HUMAN 4ML
Type: IMPLANTABLE DEVICE | Site: LEFT, RIGHT | Status: NON-FUNCTIONAL
Removed: 2023-09-11

## 2023-09-11 DEVICE — INTERCEED 3 X 4"
Type: IMPLANTABLE DEVICE | Site: LEFT, RIGHT | Status: NON-FUNCTIONAL
Removed: 2023-09-11

## 2023-09-11 RX ORDER — OXYCODONE HYDROCHLORIDE 5 MG/1
1 TABLET ORAL
Qty: 6 | Refills: 0
Start: 2023-09-11 | End: 2023-09-12

## 2023-09-11 RX ORDER — ACETAMINOPHEN 500 MG
1000 TABLET ORAL ONCE
Refills: 0 | Status: COMPLETED | OUTPATIENT
Start: 2023-09-11 | End: 2023-09-11

## 2023-09-11 RX ORDER — SODIUM CHLORIDE 9 MG/ML
3 INJECTION INTRAMUSCULAR; INTRAVENOUS; SUBCUTANEOUS EVERY 8 HOURS
Refills: 0 | Status: DISCONTINUED | OUTPATIENT
Start: 2023-09-11 | End: 2023-09-11

## 2023-09-11 RX ORDER — HYDROCHLOROTHIAZIDE 25 MG
1 TABLET ORAL
Refills: 0 | DISCHARGE

## 2023-09-11 RX ORDER — LIDOCAINE HCL 20 MG/ML
0.2 VIAL (ML) INJECTION ONCE
Refills: 0 | Status: DISCONTINUED | OUTPATIENT
Start: 2023-09-11 | End: 2023-09-11

## 2023-09-11 RX ORDER — HYDROMORPHONE HYDROCHLORIDE 2 MG/ML
0.5 INJECTION INTRAMUSCULAR; INTRAVENOUS; SUBCUTANEOUS
Refills: 0 | Status: DISCONTINUED | OUTPATIENT
Start: 2023-09-11 | End: 2023-09-11

## 2023-09-11 RX ORDER — NORETHINDRONE 0.35 MG/1
2 TABLET ORAL
Refills: 0 | DISCHARGE

## 2023-09-11 RX ORDER — AMLODIPINE BESYLATE 2.5 MG/1
1 TABLET ORAL
Refills: 0 | DISCHARGE

## 2023-09-11 RX ORDER — OXYCODONE HYDROCHLORIDE 5 MG/1
5 TABLET ORAL ONCE
Refills: 0 | Status: DISCONTINUED | OUTPATIENT
Start: 2023-09-11 | End: 2023-09-11

## 2023-09-11 RX ORDER — OXYCODONE HYDROCHLORIDE 5 MG/1
1 TABLET ORAL
Qty: 8 | Refills: 0
Start: 2023-09-11 | End: 2023-09-12

## 2023-09-11 RX ORDER — CHLORHEXIDINE GLUCONATE 213 G/1000ML
1 SOLUTION TOPICAL ONCE
Refills: 0 | Status: COMPLETED | OUTPATIENT
Start: 2023-09-11 | End: 2023-09-11

## 2023-09-11 RX ORDER — ONDANSETRON 8 MG/1
4 TABLET, FILM COATED ORAL ONCE
Refills: 0 | Status: DISCONTINUED | OUTPATIENT
Start: 2023-09-11 | End: 2023-09-11

## 2023-09-11 RX ORDER — GABAPENTIN 400 MG/1
600 CAPSULE ORAL ONCE
Refills: 0 | Status: COMPLETED | OUTPATIENT
Start: 2023-09-11 | End: 2023-09-11

## 2023-09-11 RX ORDER — CELECOXIB 200 MG/1
400 CAPSULE ORAL ONCE
Refills: 0 | Status: COMPLETED | OUTPATIENT
Start: 2023-09-11 | End: 2023-09-11

## 2023-09-11 RX ADMIN — CHLORHEXIDINE GLUCONATE 1 APPLICATION(S): 213 SOLUTION TOPICAL at 11:50

## 2023-09-11 RX ADMIN — HYDROMORPHONE HYDROCHLORIDE 0.5 MILLIGRAM(S): 2 INJECTION INTRAMUSCULAR; INTRAVENOUS; SUBCUTANEOUS at 17:45

## 2023-09-11 RX ADMIN — CELECOXIB 400 MILLIGRAM(S): 200 CAPSULE ORAL at 11:53

## 2023-09-11 RX ADMIN — OXYCODONE HYDROCHLORIDE 5 MILLIGRAM(S): 5 TABLET ORAL at 19:12

## 2023-09-11 RX ADMIN — Medication 1000 MILLIGRAM(S): at 19:12

## 2023-09-11 RX ADMIN — Medication 400 MILLIGRAM(S): at 19:06

## 2023-09-11 RX ADMIN — GABAPENTIN 600 MILLIGRAM(S): 400 CAPSULE ORAL at 11:54

## 2023-09-11 RX ADMIN — Medication 1000 MILLIGRAM(S): at 11:53

## 2023-09-11 RX ADMIN — HYDROMORPHONE HYDROCHLORIDE 0.5 MILLIGRAM(S): 2 INJECTION INTRAMUSCULAR; INTRAVENOUS; SUBCUTANEOUS at 17:27

## 2023-09-11 RX ADMIN — OXYCODONE HYDROCHLORIDE 5 MILLIGRAM(S): 5 TABLET ORAL at 18:06

## 2023-09-11 NOTE — BRIEF OPERATIVE NOTE - NSICDXBRIEFPOSTOP_GEN_ALL_CORE_FT
POST-OP DIAGNOSIS:  Abnormal uterine bleeding 11-Sep-2023 16:32:42  Zeina Boudreaux  Hydrosalpinx 11-Sep-2023 16:33:28  Zeina Boudreaux

## 2023-09-11 NOTE — ASU DISCHARGE PLAN (ADULT/PEDIATRIC) - CARE PROVIDER_API CALL
Sarah Yu  Obstetrics and Gynecology  [address] [place] [address], [address]  [place] [address]  Phone: [phone]  Fax: [phone]  Follow Up Time:

## 2023-09-11 NOTE — BRIEF OPERATIVE NOTE - NSICDXBRIEFPROCEDURE_GEN_ALL_CORE_FT
PROCEDURES:  Laparoscopic total hysterectomy with bilateral salpingectomy and cystoscopy 11-Sep-2023 16:32:29  Zeina Boudreaux

## 2023-09-11 NOTE — CHART NOTE - NSCHARTNOTEFT_GEN_A_CORE
R3 GYN POST-OP CHECK    S: Patient seen and evaluated at bedside.  Patient drowsy, easily rousable. Complaining of vaginal pain, but reports abdominal pain under control. Pt denies N/V, SOB, CP, palpitations, fever/chills. Tolerating clears.  OOB and voiding spontaneously.     O:   T(C): 36.7 (09-11-23 @ 17:30), Max: 36.7 (09-11-23 @ 16:00)  HR: 100 (09-11-23 @ 18:00) (96 - 104)  BP: 124/71 (09-11-23 @ 18:00) (124/71 - 149/83)  RR: 18 (09-11-23 @ 18:00) (14 - 18)  SpO2: 100% (09-11-23 @ 18:00) (93% - 100%)  Wt(kg): --  I&O's Summary    11 Sep 2023 07:01  -  11 Sep 2023 18:06  --------------------------------------------------------  IN: 0 mL / OUT: 400 mL / NET: -400 mL        Gen: Resting comfortably in chair, NAD  Abd: soft, appropriately tender  Umbilical Inc: Clean/dry/intact w/ bandage in place, Lateral port site incisions with steri-strips in place         A/P: 53y F s/p TLH, BS, cysto () recovering well and meeting postoperative milestones.     Neuro: PO Analgesia PRN    CV: Hemodynamically stable.    Pulm: Saturating well on room air.  Encourage OOB and incentive spirometer use.   GI: Advance to regular diet. Anti-emetics PRN.  : Voiding spontaneously   Heme: s/p HSQ preop. DVT ppx w/ SCD's while in bed. Early ambulation, initially with assistance then as tolerated.   Endo: No active issues   Dispo: Discharge from PACU when criteria met.     D/w Dr. Yu and Dr. Kanika Gordillo, PGY3

## 2023-09-11 NOTE — ASU PREOP CHECKLIST - TEMPERATURE IN CELSIUS (DEGREES C)
This is a recent snapshot of the patient's [facility] medical record.  For current drug dose and complete information and questions, call [phone] or In Basket pool, [facility] home infusion ([id number])  CSN Number:  [id number]       37

## 2023-09-11 NOTE — BRIEF OPERATIVE NOTE - NSICDXBRIEFPREOP_GEN_ALL_CORE_FT
PRE-OP DIAGNOSIS:  Abnormal uterine bleeding 11-Sep-2023 16:32:39  Zeina Boudreaux  Adnexal mass 11-Sep-2023 16:33:19  Zeina Boudreaux

## 2023-09-11 NOTE — BRIEF OPERATIVE NOTE - OPERATION/FINDINGS
EUA: grossly normal external genitalia. Uterus 8w mobile. Mild anterior prolapse noted. Cervix normal. Adnexa nonpalpable  Lsc: Entry site atraumatic. Mild omental adhesions to the upper abdominal wall. Filmly adhesions of sigmoid mesocolon to L ovary. Bl hydrosalpinges. Bl ovaries grossly normal. Uterus approx 8 weeks globular. Bladder adherent to MAY. No other evidence of disease.   Cystoscopy: normal bladder mucosa. No suture material. Bl UO efflux noted.

## 2023-09-20 LAB — SURGICAL PATHOLOGY STUDY: SIGNIFICANT CHANGE UP

## 2023-09-26 ENCOUNTER — APPOINTMENT (OUTPATIENT)
Dept: OBGYN | Facility: CLINIC | Age: 53
End: 2023-09-26
Payer: COMMERCIAL

## 2023-09-26 VITALS
SYSTOLIC BLOOD PRESSURE: 119 MMHG | WEIGHT: 262 LBS | HEART RATE: 96 BPM | DIASTOLIC BLOOD PRESSURE: 86 MMHG | HEIGHT: 66 IN | BODY MASS INDEX: 42.11 KG/M2

## 2023-09-26 DIAGNOSIS — R35.0 FREQUENCY OF MICTURITION: ICD-10-CM

## 2023-09-26 PROCEDURE — 99024 POSTOP FOLLOW-UP VISIT: CPT

## 2023-09-28 LAB — BACTERIA UR CULT: NORMAL

## 2023-10-02 ENCOUNTER — RX RENEWAL (OUTPATIENT)
Age: 53
End: 2023-10-02

## 2023-10-20 ENCOUNTER — APPOINTMENT (OUTPATIENT)
Dept: NEUROLOGY | Facility: CLINIC | Age: 53
End: 2023-10-20

## 2023-10-24 ENCOUNTER — APPOINTMENT (OUTPATIENT)
Dept: OBGYN | Facility: CLINIC | Age: 53
End: 2023-10-24
Payer: COMMERCIAL

## 2023-10-24 VITALS
HEIGHT: 66 IN | BODY MASS INDEX: 43.71 KG/M2 | DIASTOLIC BLOOD PRESSURE: 67 MMHG | WEIGHT: 272 LBS | HEART RATE: 96 BPM | SYSTOLIC BLOOD PRESSURE: 95 MMHG

## 2023-10-24 PROCEDURE — 99024 POSTOP FOLLOW-UP VISIT: CPT

## 2024-01-04 NOTE — ED PROVIDER NOTE - OBJECTIVE STATEMENT
Assessment:  Huan Monaco is a  17 y.o. male [employer] [employer] [facility] [employer] [facility] [facility] [employer] with a chief complaint of Pain of the Left Knee    Left knee injury during lacrosse 12/29  Medial meniscus tear of posterior horn or longitudal tear   Concern for   Partial ACL tear vs ACL sprain     Encounter Diagnoses   Name Primary?    Sprain of anterior cruciate ligament of left knee, initial encounter Yes    Other tear of medial meniscus of left knee as current injury, initial encounter         Plan:  MRI reviewed in detail   Recommend surgical intervention to address mensicus and possible ACL injury  Left knee arthroscopy, medial mensicus repair vs meniscectomy, possible repair of anterior cruciate ligament with possible use of BEAR implant versus possible anterior cruciate ligament reconstruction with quadriceps tendon autograft, any indicated procedures.   Pre-op labs on the way out   ACL Repair (BEAR Procedure) Information      Follow-up:Surgery  or sooner if there are any problems between now and then.    Leave Review:   Google: Leave Google Review  Healthgrades: Leave Healthgrades Review    After Hours Number: [phone]     
50 yo F with DM and HTn presents after falling yesterday at 9 am. Patient spilled on ice, falling backwards. Patient was able to stand up and ambulate. Denies head trauma or LOC, not on blood thinners.  She then developed lower back pain with no lower extremity neuro deficits, urinary or bowel incontinence. reports left shoulder pain and left ventral wrist pain.

## 2024-01-08 ENCOUNTER — APPOINTMENT (OUTPATIENT)
Dept: NEUROLOGY | Facility: CLINIC | Age: 54
End: 2024-01-08

## 2024-03-20 ENCOUNTER — OUTPATIENT (OUTPATIENT)
Dept: OUTPATIENT SERVICES | Facility: HOSPITAL | Age: 54
LOS: 1 days | End: 2024-03-20
Payer: COMMERCIAL

## 2024-03-20 ENCOUNTER — APPOINTMENT (OUTPATIENT)
Dept: INTERNAL MEDICINE | Facility: CLINIC | Age: 54
End: 2024-03-20
Payer: COMMERCIAL

## 2024-03-20 VITALS
BODY MASS INDEX: 44.68 KG/M2 | SYSTOLIC BLOOD PRESSURE: 130 MMHG | DIASTOLIC BLOOD PRESSURE: 70 MMHG | OXYGEN SATURATION: 96 % | HEIGHT: 66 IN | WEIGHT: 278 LBS | HEART RATE: 81 BPM

## 2024-03-20 DIAGNOSIS — I10 ESSENTIAL (PRIMARY) HYPERTENSION: ICD-10-CM

## 2024-03-20 DIAGNOSIS — D25.9 LEIOMYOMA OF UTERUS, UNSPECIFIED: Chronic | ICD-10-CM

## 2024-03-20 DIAGNOSIS — Z98.891 HISTORY OF UTERINE SCAR FROM PREVIOUS SURGERY: Chronic | ICD-10-CM

## 2024-03-20 DIAGNOSIS — G57.12 MERALGIA PARESTHETICA, LEFT LOWER LIMB: ICD-10-CM

## 2024-03-20 DIAGNOSIS — E55.9 VITAMIN D DEFICIENCY, UNSPECIFIED: ICD-10-CM

## 2024-03-20 PROCEDURE — 99214 OFFICE O/P EST MOD 30 MIN: CPT

## 2024-03-20 PROCEDURE — G2211 COMPLEX E/M VISIT ADD ON: CPT

## 2024-03-20 PROCEDURE — G0463: CPT

## 2024-03-20 RX ORDER — NORETHINDRONE ACETATE 5 MG/1
5 TABLET ORAL DAILY
Qty: 28 | Refills: 3 | Status: DISCONTINUED | COMMUNITY
Start: 2023-04-27 | End: 2024-03-20

## 2024-03-20 RX ORDER — OMEPRAZOLE 40 MG/1
40 CAPSULE, DELAYED RELEASE ORAL
Qty: 1 | Refills: 5 | Status: DISCONTINUED | COMMUNITY
Start: 2022-12-20 | End: 2024-03-20

## 2024-03-25 ENCOUNTER — TRANSCRIPTION ENCOUNTER (OUTPATIENT)
Age: 54
End: 2024-03-25

## 2024-03-25 LAB
25(OH)D3 SERPL-MCNC: 23.3 NG/ML
ALBUMIN SERPL ELPH-MCNC: 4.4 G/DL
ALP BLD-CCNC: 82 U/L
ALT SERPL-CCNC: 18 U/L
ANION GAP SERPL CALC-SCNC: 12 MMOL/L
AST SERPL-CCNC: 19 U/L
BILIRUB SERPL-MCNC: 0.4 MG/DL
BUN SERPL-MCNC: 17 MG/DL
CALCIUM SERPL-MCNC: 10.2 MG/DL
CHLORIDE SERPL-SCNC: 101 MMOL/L
CHOLEST SERPL-MCNC: 181 MG/DL
CO2 SERPL-SCNC: 27 MMOL/L
CREAT SERPL-MCNC: 0.98 MG/DL
EGFR: 69 ML/MIN/1.73M2
ESTIMATED AVERAGE GLUCOSE: 126 MG/DL
GLUCOSE SERPL-MCNC: 92 MG/DL
HBA1C MFR BLD HPLC: 6 %
HCT VFR BLD CALC: 37.2 %
HDLC SERPL-MCNC: 57 MG/DL
HGB BLD-MCNC: 11.7 G/DL
LDLC SERPL CALC-MCNC: 111 MG/DL
MCHC RBC-ENTMCNC: 27.3 PG
MCHC RBC-ENTMCNC: 31.5 GM/DL
MCV RBC AUTO: 86.9 FL
NONHDLC SERPL-MCNC: 124 MG/DL
PLATELET # BLD AUTO: 279 K/UL
POTASSIUM SERPL-SCNC: 4.2 MMOL/L
PROT SERPL-MCNC: 8 G/DL
RBC # BLD: 4.28 M/UL
RBC # FLD: 14.9 %
SODIUM SERPL-SCNC: 139 MMOL/L
TRIGL SERPL-MCNC: 69 MG/DL
TSH SERPL-ACNC: 1.87 UIU/ML
WBC # FLD AUTO: 9.5 K/UL

## 2024-03-25 NOTE — REVIEW OF SYSTEMS
[Recent Change In Weight] : ~T recent weight change [Negative] : Heme/Lymph [FreeTextEntry9] : see hpi [de-identified] : see hpi [de-identified] : see hpi

## 2024-03-25 NOTE — ASSESSMENT
[FreeTextEntry1] : 54 yo female with h/o as above including morbid obesity, HTN here for f/up visit and to address a few issues. 1.  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  CV - bp at goal, c/w above regimen, pedal edema likely from amlodipine; check lipids; check bmp on hctz 3.  Derm - suspect dry skin on back causing pruritis, advised soap (dove) with moisturizer, avoid detergents with dyes/perfumes 4.  Endo - check TSH for weight gain, A1c for obesity/prediabetes, vitamin d for deficiency, interested in medication for weight loss, will see if mounjaro is covered, offered weight management team but difficult for her to attend appointments due to work hours 5.  RTO few months cpe as due [employer]

## 2024-03-25 NOTE — HISTORY OF PRESENT ILLNESS
[FreeTextEntry8] : 54 yo female with h/o as below including HTN and morbid obesity here for f/up visit. Feels left thigh is numb to touch in anterior left thigh and on lateral left thigh, painful in groin, no injury to area.  Going on for past week, unchanged from initially. Having a lot of back pain lower back and hip, more on right side and pain will radiate down right leg.  However, numbness left thigh is different. Using heating pad without relief.  For pain in back trying tylenol. Feet are swollen, will have pain in feet if sitting for a long period of time and then gets up, told has bone spurs and did have injections in feet in past. Had hysterectomy, went well but sometimes feels a little pain, told has scar tissue.  Wondering if in menopause, gets hot and cold sometimes. Having itching in back (upper back but all of back) and scalp so not sure if due to wig. Gained a lot of weight. No other active issues.

## 2024-03-25 NOTE — PHYSICAL EXAM
[No Acute Distress] : no acute distress [Well Nourished] : well nourished [Well Developed] : well developed [Well-Appearing] : well-appearing [Supple] : supple [No Respiratory Distress] : no respiratory distress  [No Accessory Muscle Use] : no accessory muscle use [Normal Rate] : normal rate  [Clear to Auscultation] : lungs were clear to auscultation bilaterally [Regular Rhythm] : with a regular rhythm [Normal S1, S2] : normal S1 and S2 [No Murmur] : no murmur heard [Soft] : abdomen soft [Non Tender] : non-tender [Non-distended] : non-distended [No Masses] : no abdominal mass palpated [No Joint Swelling] : no joint swelling [No Rash] : no rash [Normal Gait] : normal gait [Normal Affect] : the affect was normal [de-identified] : morbid obesity [de-identified] : 1+ pitting edema feet b/l [de-identified] : focal mild spinal tenderness lower lumbar spine [de-identified] : FROM left hip but +groin discomfort on flexion left hip; no tenderness to palpation lateral left hip; no palpable abnl left inguinal region [de-identified] : dry skin and slight lichenification/darkening several areas of back [de-identified] : nl sensation to light touch anteromedial left thigh, but decreased sensation to light touch, anterolateral and lateral left thigh

## 2024-03-25 NOTE — ADDENDUM
[FreeTextEntry1] : 3/25/24:  Reviewed labs, nl except  to work on diet, A1c 6.0 c/w worsening preDM to work on diet/exercise and glp 1 rx given, vitamin D 23 c/w insufficiency to take supplement, other labs nl - mailed/messaged to pt.

## 2024-03-26 DIAGNOSIS — G57.12 MERALGIA PARESTHETICA, LEFT LOWER LIMB: ICD-10-CM

## 2024-03-26 DIAGNOSIS — E55.9 VITAMIN D DEFICIENCY, UNSPECIFIED: ICD-10-CM

## 2024-03-26 DIAGNOSIS — R73.03 PREDIABETES: ICD-10-CM

## 2024-03-26 DIAGNOSIS — S76.212A STRAIN OF ADDUCTOR MUSCLE, FASCIA AND TENDON OF LEFT THIGH, INITIAL ENCOUNTER: ICD-10-CM

## 2024-03-26 DIAGNOSIS — E66.01 MORBID (SEVERE) OBESITY DUE TO EXCESS CALORIES: ICD-10-CM

## 2024-04-02 ENCOUNTER — RX RENEWAL (OUTPATIENT)
Age: 54
End: 2024-04-02

## 2024-04-02 RX ORDER — HYDROCHLOROTHIAZIDE 12.5 MG/1
12.5 TABLET ORAL
Qty: 30 | Refills: 5 | Status: ACTIVE | COMMUNITY
Start: 2022-10-31 | End: 1900-01-01

## 2024-05-19 ENCOUNTER — RX RENEWAL (OUTPATIENT)
Age: 54
End: 2024-05-19

## 2024-05-19 RX ORDER — AMLODIPINE BESYLATE 5 MG/1
5 TABLET ORAL DAILY
Qty: 90 | Refills: 1 | Status: ACTIVE | COMMUNITY
Start: 2020-03-10 | End: 1900-01-01

## 2024-06-10 ENCOUNTER — OUTPATIENT (OUTPATIENT)
Dept: OUTPATIENT SERVICES | Facility: HOSPITAL | Age: 54
LOS: 1 days | End: 2024-06-10
Payer: COMMERCIAL

## 2024-06-10 ENCOUNTER — APPOINTMENT (OUTPATIENT)
Dept: INTERNAL MEDICINE | Facility: CLINIC | Age: 54
End: 2024-06-10
Payer: COMMERCIAL

## 2024-06-10 VITALS
HEIGHT: 66 IN | OXYGEN SATURATION: 98 % | BODY MASS INDEX: 41.95 KG/M2 | WEIGHT: 261 LBS | DIASTOLIC BLOOD PRESSURE: 84 MMHG | HEART RATE: 78 BPM | SYSTOLIC BLOOD PRESSURE: 108 MMHG

## 2024-06-10 DIAGNOSIS — Z01.818 ENCOUNTER FOR OTHER PREPROCEDURAL EXAMINATION: ICD-10-CM

## 2024-06-10 DIAGNOSIS — K29.70 GASTRITIS, UNSPECIFIED, W/OUT BLEEDING: ICD-10-CM

## 2024-06-10 DIAGNOSIS — R73.03 PREDIABETES.: ICD-10-CM

## 2024-06-10 DIAGNOSIS — E66.01 MORBID (SEVERE) OBESITY DUE TO EXCESS CALORIES: ICD-10-CM

## 2024-06-10 DIAGNOSIS — D25.9 LEIOMYOMA OF UTERUS, UNSPECIFIED: Chronic | ICD-10-CM

## 2024-06-10 DIAGNOSIS — Z87.898 PERSONAL HISTORY OF OTHER SPECIFIED CONDITIONS: ICD-10-CM

## 2024-06-10 DIAGNOSIS — I10 ESSENTIAL (PRIMARY) HYPERTENSION: ICD-10-CM

## 2024-06-10 DIAGNOSIS — Z98.891 HISTORY OF UTERINE SCAR FROM PREVIOUS SURGERY: Chronic | ICD-10-CM

## 2024-06-10 DIAGNOSIS — B96.81 GASTRITIS, UNSPECIFIED, W/OUT BLEEDING: ICD-10-CM

## 2024-06-10 DIAGNOSIS — Z87.42 PERSONAL HISTORY OF OTHER DISEASES OF THE FEMALE GENITAL TRACT: ICD-10-CM

## 2024-06-10 DIAGNOSIS — S76.212A STRAIN OF ADDUCTOR MUSCLE, FASCIA AND TENDON OF LEFT THIGH, INITIAL ENCOUNTER: ICD-10-CM

## 2024-06-10 DIAGNOSIS — Z00.00 ENCOUNTER FOR GENERAL ADULT MEDICAL EXAMINATION W/OUT ABNORMAL FINDINGS: ICD-10-CM

## 2024-06-10 PROCEDURE — G0463: CPT

## 2024-06-10 PROCEDURE — 99396 PREV VISIT EST AGE 40-64: CPT

## 2024-06-10 RX ORDER — ACETAMINOPHEN 500 MG/1
500 TABLET ORAL EVERY 6 HOURS
Qty: 240 | Refills: 2 | Status: DISCONTINUED | COMMUNITY
Start: 2023-03-29 | End: 2024-06-10

## 2024-06-10 RX ORDER — TIRZEPATIDE 5 MG/.5ML
5 INJECTION, SOLUTION SUBCUTANEOUS
Qty: 4 | Refills: 3 | Status: ACTIVE | COMMUNITY
Start: 2024-06-10 | End: 1900-01-01

## 2024-06-10 RX ORDER — TIRZEPATIDE 2.5 MG/.5ML
2.5 INJECTION, SOLUTION SUBCUTANEOUS
Qty: 4 | Refills: 3 | Status: DISCONTINUED | COMMUNITY
Start: 2024-03-20 | End: 2024-06-10

## 2024-06-10 NOTE — PHYSICAL EXAM
[No Acute Distress] : no acute distress [Well Nourished] : well nourished [Well Developed] : well developed [Well-Appearing] : well-appearing [PERRL] : pupils equal round and reactive to light [EOMI] : extraocular movements intact [Normal Oropharynx] : the oropharynx was normal [No Lymphadenopathy] : no lymphadenopathy [Supple] : supple [Thyroid Normal, No Nodules] : the thyroid was normal and there were no nodules present [No Respiratory Distress] : no respiratory distress  [No Accessory Muscle Use] : no accessory muscle use [Clear to Auscultation] : lungs were clear to auscultation bilaterally [Normal Rate] : normal rate  [Regular Rhythm] : with a regular rhythm [Normal S1, S2] : normal S1 and S2 [No Murmur] : no murmur heard [No Carotid Bruits] : no carotid bruits [Pedal Pulses Present] : the pedal pulses are present [Normal Appearance] : normal in appearance [No Nipple Discharge] : no nipple discharge [No Axillary Lymphadenopathy] : no axillary lymphadenopathy [Soft] : abdomen soft [Non Tender] : non-tender [Non-distended] : non-distended [No Masses] : no abdominal mass palpated [No HSM] : no HSM [Normal Bowel Sounds] : normal bowel sounds [Normal Supraclavicular Nodes] : no supraclavicular lymphadenopathy [Normal Axillary Nodes] : no axillary lymphadenopathy [Normal Posterior Cervical Nodes] : no posterior cervical lymphadenopathy [Normal Anterior Cervical Nodes] : no anterior cervical lymphadenopathy [Normal Inguinal Nodes] : no inguinal lymphadenopathy [No Joint Swelling] : no joint swelling [No Rash] : no rash [Normal Gait] : normal gait [Normal Affect] : the affect was normal [de-identified] : slightly opaque TMS with slight trabeculated appearance [de-identified] : trace pitting edema LE [de-identified] : tattoo left hand (old)

## 2024-06-10 NOTE — PAST MEDICAL HISTORY
[Menstruating] : menstruating [Excessive Bleeding] : there was excessive bleeding [Regular Cycle Intervals] : have been regular [Total Preg ___] : G[unfilled] [Full Term ___] : Full Term: [unfilled] [AB Spont ___] : miscarriages: [unfilled]  [FreeTextEntry1] : no vaginal bleeding, s/p TLH with b/l salpingectomy

## 2024-06-10 NOTE — HISTORY OF PRESENT ILLNESS
[FreeTextEntry1] : physical [de-identified] : 52 yo female with h/o as below here for CPE. Has been on mounjaro past few months, more frequent BMs and if eating wrong food will have vomiting, otherwise tolerating medication well without other side effects.  Lost 17 lbs so far.  Would like to increase mounjaro dose. Has decreased appetite with mounjaro, has to avoid greasy foods.  Will try 80 grams/protein per day.   Numbness left leg not as bad as before.   No other active issues.

## 2024-06-10 NOTE — HEALTH RISK ASSESSMENT
[Patient reported mammogram was normal] : Patient reported mammogram was normal [Patient reported PAP Smear was normal] : Patient reported PAP Smear was normal [Patient reported colonoscopy was normal] : Patient reported colonoscopy was normal [No] : In the past 12 months have you used drugs other than those required for medical reasons? No [0] : 2) Feeling down, depressed, or hopeless: Not at all (0) [Never] : Never [MammogramDate] : 04/23 [PapSmearDate] : 03/23 [PapSmearComments] : cervix removed with hysterectomy [ColonoscopyDate] : 12/22 [ColonoscopyComments] : polyps, advised 5 year f/up; H pylori positive EGD (with negative test of cure afterwards)

## 2024-06-10 NOTE — ASSESSMENT
[FreeTextEntry1] : 54 yo female with h/o as above including HTN, morbid obesity, preDM here for CPE. 1.  Endo - weight loss with mounjaro, will increase dose to optimize; had recent a1c (preDM, now treated with mounjaro), tsh, vitamin D done 2.  ENT - some irregularity to TMs b/l, noting has some tinnitus at times (?pulsatile), advised to see ENT if persists 3.  GI - colonoscopy utd 4.  Gyn - no further paps needed, rx mammo given as due 5.  CV - bp at goal, lipids acceptable few months ago 6.  HCM - had recent labs done few months ago so hold on labs; consider shingrix, other vaccines utd 7.  RTO 6 months bp check

## 2024-06-10 NOTE — REVIEW OF SYSTEMS
[Recent Change In Weight] : ~T recent weight change [Negative] : ENT [Fever] : no fever [Chills] : no chills [Fatigue] : no fatigue [Chest Pain] : no chest pain [Palpitations] : no palpitations [Shortness Of Breath] : no shortness of breath [Cough] : no cough [Dyspnea on Exertion] : no dyspnea on exertion [Abdominal Pain] : no abdominal pain [Nausea] : no nausea [Constipation] : no constipation [Diarrhea] : diarrhea [Vomiting] : no vomiting [Heartburn] : no heartburn [Melena] : no melena [Dysuria] : no dysuria [Vaginal Discharge] : no vaginal discharge [Skin Rash] : no skin rash [Headache] : no headache [Dizziness] : no dizziness [Fainting] : no fainting [Anxiety] : no anxiety [Depression] : no depression [Easy Bleeding] : no easy bleeding [Easy Bruising] : no easy bruising [FreeTextEntry2] : active at work [FreeTextEntry9] : back pain [FreeTextEntry3] : sees optho

## 2024-06-18 DIAGNOSIS — E66.01 MORBID (SEVERE) OBESITY DUE TO EXCESS CALORIES: ICD-10-CM

## 2024-06-18 DIAGNOSIS — Z00.00 ENCOUNTER FOR GENERAL ADULT MEDICAL EXAMINATION WITHOUT ABNORMAL FINDINGS: ICD-10-CM

## 2024-06-18 DIAGNOSIS — R73.03 PREDIABETES: ICD-10-CM

## 2024-09-05 ENCOUNTER — RX RENEWAL (OUTPATIENT)
Age: 54
End: 2024-09-05

## 2024-10-02 ENCOUNTER — RX RENEWAL (OUTPATIENT)
Age: 54
End: 2024-10-02

## 2024-10-29 ENCOUNTER — RX RENEWAL (OUTPATIENT)
Age: 54
End: 2024-10-29

## (undated) DEVICE — POSITIONER PINK PAD PIGAZZI SYSTEM

## (undated) DEVICE — BLADE SCALPEL SAFETYLOCK #15

## (undated) DEVICE — TUBING STRYKEFLOW II SUCTION / IRRIGATOR

## (undated) DEVICE — SUT VLOC 180 0 9" GS-21 GREEN

## (undated) DEVICE — TUBING TUR 2 PRONG

## (undated) DEVICE — DRAPE TOWEL BLUE 17" X 24"

## (undated) DEVICE — TROCAR COVIDIEN VERSASTEP PLUS 15MM STANDARD

## (undated) DEVICE — TROCAR GELPOINT MINI ADVANCED

## (undated) DEVICE — SPECIMEN CONTAINER 100ML

## (undated) DEVICE — LIGASURE BLUNT TIP 37CM

## (undated) DEVICE — UTERINE MANIPULATOR CLINICAL INNOVATIONS CLEARVIEW 7CM

## (undated) DEVICE — MEDICATION LABELS W MARKER

## (undated) DEVICE — FOLEY TRAY 16FR 5CC LTX UMETER CLOSED

## (undated) DEVICE — TROCAR APPLIED MEDICAL KII BALLOON BLUNT TIP 12MM X 100MM

## (undated) DEVICE — TROCAR COVIDIEN VERSASTEP PLUS 11MM STANDARD

## (undated) DEVICE — PREP CHLORAPREP HI-LITE ORANGE 26ML

## (undated) DEVICE — POSITIONER PURPLE ARM ONE STEP (LARGE)

## (undated) DEVICE — UTERINE MANIPULATOR COOPER SURGICAL 5MM 33CM GREEN

## (undated) DEVICE — Device

## (undated) DEVICE — SYR LUER LOK 10CC

## (undated) DEVICE — NDL HYPO REGULAR BEVEL 22G X 1.5" (TURQUOISE)

## (undated) DEVICE — FOLEY TRAY 16FR LF URINE METER SURESTEP

## (undated) DEVICE — APPLICATOR VISTASEAL LAP DUAL 35CM RIGID

## (undated) DEVICE — APPLICATOR FOR ARISTA XL 38CM

## (undated) DEVICE — WARMING BLANKET UPPER ADULT

## (undated) DEVICE — PACK CYSTO

## (undated) DEVICE — DRAPE 3/4 SHEET W REINFORCEMENT 56X77"

## (undated) DEVICE — DRAPE MAYO STAND 30"

## (undated) DEVICE — TUBING INSUFFLATION LAP FILTER 10FT

## (undated) DEVICE — UTERINE MANIPULATOR CONMED VCARE MED 34MM

## (undated) DEVICE — ELCTR LAPAROSCOPIC WIRE L HOOK 36CM

## (undated) DEVICE — DRSG STERISTRIPS 0.5 X 4"

## (undated) DEVICE — GOWN TRIMAX LG

## (undated) DEVICE — ENDOCATCH II 15MM

## (undated) DEVICE — LIGASURE ATLAS 10MM 37CM

## (undated) DEVICE — TROCAR COVIDIEN BLUNT TIP HASSAN 10MM

## (undated) DEVICE — UTERINE MANIPULATOR CONMED VCARE LG 37MM

## (undated) DEVICE — TUBING SMOKE EVACUATOR

## (undated) DEVICE — DRAPE INSTRUMENT POUCH 6.75" X 11"

## (undated) DEVICE — GLV 6.5 PROTEXIS (WHITE)

## (undated) DEVICE — POSITIONER FOAM EGG CRATE ULNAR 2PCS (PINK)

## (undated) DEVICE — MARKING PEN W RULER

## (undated) DEVICE — TROCAR COVIDIEN VERSASTEP PLUS 12MM STANDARD

## (undated) DEVICE — BLADE SCALPEL SAFETYLOCK #10

## (undated) DEVICE — SUT VLOC 180 0 18" GS-21 GREEN

## (undated) DEVICE — DRAPE LIGHT HANDLE COVER (BLUE)

## (undated) DEVICE — PREP CHLOROHEXIDINE 4% 118CC KIT

## (undated) DEVICE — GLV 7.5 PROTEXIS (WHITE)

## (undated) DEVICE — GLV 7 PROTEXIS (WHITE)

## (undated) DEVICE — ENDOCATCH 10MM SPECIMEN POUCH

## (undated) DEVICE — OLYMPUS PK SPATULA 5MM

## (undated) DEVICE — ELCTR PKS PLASMA SPATULA 5MM 33CM

## (undated) DEVICE — SOL IRR POUR H2O 250ML

## (undated) DEVICE — RUMI TIP BLUE 6.7MM X 8CM

## (undated) DEVICE — SOL IRR POUR NS 0.9% 500ML

## (undated) DEVICE — SUT POLYSORB 0 30" GS-23

## (undated) DEVICE — INSUFFLATION NDL COVIDIEN STEP 14G FOR STEP/VERSASTEP

## (undated) DEVICE — UTERINE MANIPULATOR CONMED VCARE SM 32MM

## (undated) DEVICE — VENODYNE/SCD SLEEVE CALF LARGE

## (undated) DEVICE — PACK GYN LAPAROSCOPY

## (undated) DEVICE — SUT VLOC 180 0 12" GS-21 GREEN

## (undated) DEVICE — DRAPE IOBAN 13" X 13"

## (undated) DEVICE — SUT BIOSYN 4-0 18" P-12